# Patient Record
Sex: FEMALE | Race: WHITE | URBAN - METROPOLITAN AREA
[De-identification: names, ages, dates, MRNs, and addresses within clinical notes are randomized per-mention and may not be internally consistent; named-entity substitution may affect disease eponyms.]

---

## 2017-02-01 LAB
C TRACH DNA SPEC QL PROBE+SIG AMP: NORMAL
HBV SURFACE AG SERPL QL IA: NORMAL
N GONORRHOEA DNA SPEC QL PROBE+SIG AMP: NORMAL
RUBELLA ANTIBODY IGG QUANTITATIVE: NORMAL IU/ML
T PALLIDUM IGG SER QL: NORMAL

## 2017-08-25 LAB — GROUP B STREP PCR: NORMAL

## 2017-09-29 ENCOUNTER — ANESTHESIA (OUTPATIENT)
Dept: OBGYN | Facility: CLINIC | Age: 27
End: 2017-09-29
Payer: COMMERCIAL

## 2017-09-29 ENCOUNTER — ANESTHESIA EVENT (OUTPATIENT)
Dept: OBGYN | Facility: CLINIC | Age: 27
End: 2017-09-29
Payer: COMMERCIAL

## 2017-09-29 ENCOUNTER — HOSPITAL ENCOUNTER (INPATIENT)
Facility: CLINIC | Age: 27
LOS: 6 days | Discharge: HOME | End: 2017-10-05
Attending: OBSTETRICS & GYNECOLOGY | Admitting: OBSTETRICS & GYNECOLOGY
Payer: COMMERCIAL

## 2017-09-29 PROBLEM — Z3A.41 POST TERM PREGNANCY, 41 WEEKS: Status: ACTIVE | Noted: 2017-09-29

## 2017-09-29 PROBLEM — O48.0 POST TERM PREGNANCY, 41 WEEKS: Status: ACTIVE | Noted: 2017-09-29

## 2017-09-29 LAB
ABO + RH BLD: NORMAL
ALT SERPL W P-5'-P-CCNC: 11 U/L (ref 0–50)
ANION GAP SERPL CALCULATED.3IONS-SCNC: 10 MMOL/L (ref 3–14)
AST SERPL W P-5'-P-CCNC: 14 U/L (ref 0–45)
BASOPHILS # BLD AUTO: 0 10E9/L (ref 0–0.2)
BASOPHILS NFR BLD AUTO: 0.2 %
BLD GP AB SCN SERPL QL: NORMAL
BLOOD BANK CMNT PATIENT-IMP: NORMAL
BUN SERPL-MCNC: 10 MG/DL (ref 7–30)
CALCIUM SERPL-MCNC: 8.8 MG/DL (ref 8.5–10.1)
CHLORIDE SERPL-SCNC: 110 MMOL/L (ref 94–109)
CO2 SERPL-SCNC: 20 MMOL/L (ref 20–32)
CREAT SERPL-MCNC: 0.59 MG/DL (ref 0.52–1.04)
DIFFERENTIAL METHOD BLD: ABNORMAL
EOSINOPHIL # BLD AUTO: 0.1 10E9/L (ref 0–0.7)
EOSINOPHIL NFR BLD AUTO: 0.7 %
ERYTHROCYTE [DISTWIDTH] IN BLOOD BY AUTOMATED COUNT: 13.2 % (ref 10–15)
GFR SERPL CREATININE-BSD FRML MDRD: >90 ML/MIN/1.7M2
GLUCOSE SERPL-MCNC: 83 MG/DL (ref 70–99)
HCT VFR BLD AUTO: 38.2 % (ref 35–47)
HGB BLD-MCNC: 13.3 G/DL (ref 11.7–15.7)
IMM GRANULOCYTES # BLD: 0.1 10E9/L (ref 0–0.4)
IMM GRANULOCYTES NFR BLD: 0.4 %
LYMPHOCYTES # BLD AUTO: 2.1 10E9/L (ref 0.8–5.3)
LYMPHOCYTES NFR BLD AUTO: 16.9 %
MCH RBC QN AUTO: 29.8 PG (ref 26.5–33)
MCHC RBC AUTO-ENTMCNC: 34.8 G/DL (ref 31.5–36.5)
MCV RBC AUTO: 86 FL (ref 78–100)
MONOCYTES # BLD AUTO: 0.7 10E9/L (ref 0–1.3)
MONOCYTES NFR BLD AUTO: 5.7 %
NEUTROPHILS # BLD AUTO: 9.3 10E9/L (ref 1.6–8.3)
NEUTROPHILS NFR BLD AUTO: 76.1 %
PLATELET # BLD AUTO: 296 10E9/L (ref 150–450)
POTASSIUM SERPL-SCNC: 3.7 MMOL/L (ref 3.4–5.3)
RBC # BLD AUTO: 4.47 10E12/L (ref 3.8–5.2)
SODIUM SERPL-SCNC: 140 MMOL/L (ref 133–144)
SPECIMEN EXP DATE BLD: NORMAL
SPECIMEN EXP DATE BLD: NORMAL
WBC # BLD AUTO: 12.2 10E9/L (ref 4–11)

## 2017-09-29 PROCEDURE — 86901 BLOOD TYPING SEROLOGIC RH(D): CPT | Performed by: OBSTETRICS & GYNECOLOGY

## 2017-09-29 PROCEDURE — 25000132 ZZH RX MED GY IP 250 OP 250 PS 637: Performed by: OBSTETRICS & GYNECOLOGY

## 2017-09-29 PROCEDURE — 10907ZC DRAINAGE OF AMNIOTIC FLUID, THERAPEUTIC FROM PRODUCTS OF CONCEPTION, VIA NATURAL OR ARTIFICIAL OPENING: ICD-10-PCS | Performed by: OBSTETRICS & GYNECOLOGY

## 2017-09-29 PROCEDURE — 36000056 ZZH SURGERY LEVEL 3 1ST 30 MIN: Performed by: OBSTETRICS & GYNECOLOGY

## 2017-09-29 PROCEDURE — 25000128 H RX IP 250 OP 636: Performed by: NURSE ANESTHETIST, CERTIFIED REGISTERED

## 2017-09-29 PROCEDURE — 3E033VJ INTRODUCTION OF OTHER HORMONE INTO PERIPHERAL VEIN, PERCUTANEOUS APPROACH: ICD-10-PCS | Performed by: OBSTETRICS & GYNECOLOGY

## 2017-09-29 PROCEDURE — 71000015 ZZH RECOVERY PHASE 1 LEVEL 2 EA ADDTL HR: Performed by: OBSTETRICS & GYNECOLOGY

## 2017-09-29 PROCEDURE — 84450 TRANSFERASE (AST) (SGOT): CPT | Performed by: OBSTETRICS & GYNECOLOGY

## 2017-09-29 PROCEDURE — 3E0R3BZ INTRODUCTION OF ANESTHETIC AGENT INTO SPINAL CANAL, PERCUTANEOUS APPROACH: ICD-10-PCS | Performed by: ANESTHESIOLOGY

## 2017-09-29 PROCEDURE — 71000014 ZZH RECOVERY PHASE 1 LEVEL 2 FIRST HR: Performed by: OBSTETRICS & GYNECOLOGY

## 2017-09-29 PROCEDURE — 86900 BLOOD TYPING SEROLOGIC ABO: CPT | Performed by: OBSTETRICS & GYNECOLOGY

## 2017-09-29 PROCEDURE — 86850 RBC ANTIBODY SCREEN: CPT | Performed by: OBSTETRICS & GYNECOLOGY

## 2017-09-29 PROCEDURE — 84460 ALANINE AMINO (ALT) (SGPT): CPT | Performed by: OBSTETRICS & GYNECOLOGY

## 2017-09-29 PROCEDURE — 25000125 ZZHC RX 250: Performed by: NURSE ANESTHETIST, CERTIFIED REGISTERED

## 2017-09-29 PROCEDURE — 80048 BASIC METABOLIC PNL TOTAL CA: CPT | Performed by: OBSTETRICS & GYNECOLOGY

## 2017-09-29 PROCEDURE — 25000125 ZZHC RX 250: Performed by: ANESTHESIOLOGY

## 2017-09-29 PROCEDURE — 86780 TREPONEMA PALLIDUM: CPT | Performed by: OBSTETRICS & GYNECOLOGY

## 2017-09-29 PROCEDURE — 37000009 ZZH ANESTHESIA TECHNICAL FEE, EACH ADDTL 15 MIN: Performed by: OBSTETRICS & GYNECOLOGY

## 2017-09-29 PROCEDURE — 37000011 ZZH ANESTHESIA WARD SERVICE

## 2017-09-29 PROCEDURE — 36415 COLL VENOUS BLD VENIPUNCTURE: CPT | Performed by: OBSTETRICS & GYNECOLOGY

## 2017-09-29 PROCEDURE — 25000128 H RX IP 250 OP 636: Performed by: ANESTHESIOLOGY

## 2017-09-29 PROCEDURE — 82565 ASSAY OF CREATININE: CPT | Performed by: OBSTETRICS & GYNECOLOGY

## 2017-09-29 PROCEDURE — 36000058 ZZH SURGERY LEVEL 3 EA 15 ADDTL MIN: Performed by: OBSTETRICS & GYNECOLOGY

## 2017-09-29 PROCEDURE — 27210794 ZZH OR GENERAL SUPPLY STERILE: Performed by: OBSTETRICS & GYNECOLOGY

## 2017-09-29 PROCEDURE — 00HU33Z INSERTION OF INFUSION DEVICE INTO SPINAL CANAL, PERCUTANEOUS APPROACH: ICD-10-PCS | Performed by: ANESTHESIOLOGY

## 2017-09-29 PROCEDURE — 85025 COMPLETE CBC W/AUTO DIFF WBC: CPT | Performed by: OBSTETRICS & GYNECOLOGY

## 2017-09-29 PROCEDURE — 37000008 ZZH ANESTHESIA TECHNICAL FEE, 1ST 30 MIN: Performed by: OBSTETRICS & GYNECOLOGY

## 2017-09-29 PROCEDURE — 12000029 ZZH R&B OB INTERMEDIATE

## 2017-09-29 PROCEDURE — 25000125 ZZHC RX 250: Performed by: OBSTETRICS & GYNECOLOGY

## 2017-09-29 PROCEDURE — 25000128 H RX IP 250 OP 636: Performed by: OBSTETRICS & GYNECOLOGY

## 2017-09-29 RX ORDER — CEFAZOLIN SODIUM 2 G/100ML
2 INJECTION, SOLUTION INTRAVENOUS
Status: DISCONTINUED | OUTPATIENT
Start: 2017-09-29 | End: 2017-09-30 | Stop reason: HOSPADM

## 2017-09-29 RX ORDER — NALOXONE HYDROCHLORIDE 0.4 MG/ML
.1-.4 INJECTION, SOLUTION INTRAMUSCULAR; INTRAVENOUS; SUBCUTANEOUS
Status: DISCONTINUED | OUTPATIENT
Start: 2017-09-29 | End: 2017-09-30

## 2017-09-29 RX ORDER — ACETAMINOPHEN 325 MG/1
650 TABLET ORAL EVERY 4 HOURS PRN
Status: DISCONTINUED | OUTPATIENT
Start: 2017-09-29 | End: 2017-09-30

## 2017-09-29 RX ORDER — SODIUM CHLORIDE, SODIUM LACTATE, POTASSIUM CHLORIDE, CALCIUM CHLORIDE 600; 310; 30; 20 MG/100ML; MG/100ML; MG/100ML; MG/100ML
INJECTION, SOLUTION INTRAVENOUS CONTINUOUS
Status: DISCONTINUED | OUTPATIENT
Start: 2017-09-29 | End: 2017-09-30

## 2017-09-29 RX ORDER — CEFAZOLIN SODIUM 1 G/3ML
1 INJECTION, POWDER, FOR SOLUTION INTRAMUSCULAR; INTRAVENOUS SEE ADMIN INSTRUCTIONS
Status: DISCONTINUED | OUTPATIENT
Start: 2017-09-29 | End: 2017-09-30 | Stop reason: HOSPADM

## 2017-09-29 RX ORDER — PRENATAL VIT/IRON FUM/FOLIC AC 27MG-0.8MG
1 TABLET ORAL DAILY
COMMUNITY

## 2017-09-29 RX ORDER — ROPIVACAINE HYDROCHLORIDE 2 MG/ML
10 INJECTION, SOLUTION EPIDURAL; INFILTRATION; PERINEURAL ONCE
Status: COMPLETED | OUTPATIENT
Start: 2017-09-29 | End: 2017-09-29

## 2017-09-29 RX ORDER — LIDOCAINE HYDROCHLORIDE 20 MG/ML
INJECTION, SOLUTION EPIDURAL; INFILTRATION; INTRACAUDAL; PERINEURAL
Status: DISCONTINUED
Start: 2017-09-29 | End: 2017-09-29 | Stop reason: HOSPADM

## 2017-09-29 RX ORDER — ONDANSETRON 2 MG/ML
4 INJECTION INTRAMUSCULAR; INTRAVENOUS EVERY 6 HOURS PRN
Status: DISCONTINUED | OUTPATIENT
Start: 2017-09-29 | End: 2017-09-30

## 2017-09-29 RX ORDER — LIDOCAINE HYDROCHLORIDE 20 MG/ML
INJECTION, SOLUTION EPIDURAL; INFILTRATION; INTRACAUDAL; PERINEURAL PRN
Status: DISCONTINUED | OUTPATIENT
Start: 2017-09-29 | End: 2017-09-30

## 2017-09-29 RX ORDER — CETIRIZINE HYDROCHLORIDE 10 MG/1
10 TABLET ORAL DAILY
COMMUNITY

## 2017-09-29 RX ORDER — CEFAZOLIN SODIUM 1 G/3ML
INJECTION, POWDER, FOR SOLUTION INTRAMUSCULAR; INTRAVENOUS PRN
Status: DISCONTINUED | OUTPATIENT
Start: 2017-09-29 | End: 2017-09-30

## 2017-09-29 RX ORDER — LIDOCAINE HYDROCHLORIDE AND EPINEPHRINE 15; 5 MG/ML; UG/ML
INJECTION, SOLUTION EPIDURAL PRN
Status: DISCONTINUED | OUTPATIENT
Start: 2017-09-29 | End: 2017-09-30 | Stop reason: HOSPADM

## 2017-09-29 RX ORDER — OXYTOCIN/0.9 % SODIUM CHLORIDE 30/500 ML
PLASTIC BAG, INJECTION (ML) INTRAVENOUS CONTINUOUS PRN
Status: DISCONTINUED | OUTPATIENT
Start: 2017-09-29 | End: 2017-09-30

## 2017-09-29 RX ORDER — PENICILLIN G POTASSIUM 5000000 [IU]/1
5 INJECTION, POWDER, FOR SOLUTION INTRAMUSCULAR; INTRAVENOUS ONCE
Status: COMPLETED | OUTPATIENT
Start: 2017-09-29 | End: 2017-09-29

## 2017-09-29 RX ORDER — METHYLERGONOVINE MALEATE 0.2 MG/ML
200 INJECTION INTRAVENOUS
Status: DISCONTINUED | OUTPATIENT
Start: 2017-09-29 | End: 2017-09-30

## 2017-09-29 RX ORDER — MORPHINE SULFATE 1 MG/ML
INJECTION, SOLUTION EPIDURAL; INTRATHECAL; INTRAVENOUS PRN
Status: DISCONTINUED | OUTPATIENT
Start: 2017-09-29 | End: 2017-09-30

## 2017-09-29 RX ORDER — SODIUM CHLORIDE, SODIUM LACTATE, POTASSIUM CHLORIDE, CALCIUM CHLORIDE 600; 310; 30; 20 MG/100ML; MG/100ML; MG/100ML; MG/100ML
INJECTION, SOLUTION INTRAVENOUS CONTINUOUS
Status: DISCONTINUED | OUTPATIENT
Start: 2017-09-29 | End: 2017-09-30 | Stop reason: HOSPADM

## 2017-09-29 RX ORDER — NALBUPHINE HYDROCHLORIDE 10 MG/ML
2.5-5 INJECTION, SOLUTION INTRAMUSCULAR; INTRAVENOUS; SUBCUTANEOUS EVERY 6 HOURS PRN
Status: DISCONTINUED | OUTPATIENT
Start: 2017-09-29 | End: 2017-09-30

## 2017-09-29 RX ORDER — OXYTOCIN/0.9 % SODIUM CHLORIDE 30/500 ML
100-340 PLASTIC BAG, INJECTION (ML) INTRAVENOUS CONTINUOUS PRN
Status: DISCONTINUED | OUTPATIENT
Start: 2017-09-29 | End: 2017-09-30

## 2017-09-29 RX ORDER — OXYTOCIN/0.9 % SODIUM CHLORIDE 30/500 ML
PLASTIC BAG, INJECTION (ML) INTRAVENOUS
Status: DISCONTINUED
Start: 2017-09-29 | End: 2017-09-29 | Stop reason: HOSPADM

## 2017-09-29 RX ORDER — ONDANSETRON 2 MG/ML
INJECTION INTRAMUSCULAR; INTRAVENOUS
Status: DISCONTINUED
Start: 2017-09-29 | End: 2017-09-29 | Stop reason: HOSPADM

## 2017-09-29 RX ORDER — EPHEDRINE SULFATE 50 MG/ML
INJECTION, SOLUTION INTRAMUSCULAR; INTRAVENOUS; SUBCUTANEOUS PRN
Status: DISCONTINUED | OUTPATIENT
Start: 2017-09-29 | End: 2017-09-30

## 2017-09-29 RX ORDER — LIDOCAINE HYDROCHLORIDE 20 MG/ML
INJECTION, SOLUTION EPIDURAL; INFILTRATION; INTRACAUDAL; PERINEURAL
Status: COMPLETED
Start: 2017-09-29 | End: 2017-09-29

## 2017-09-29 RX ORDER — MORPHINE SULFATE 1 MG/ML
INJECTION, SOLUTION EPIDURAL; INTRATHECAL; INTRAVENOUS
Status: DISCONTINUED
Start: 2017-09-29 | End: 2017-09-29 | Stop reason: HOSPADM

## 2017-09-29 RX ORDER — LIDOCAINE HCL/EPINEPHRINE/PF 2%-1:200K
VIAL (ML) INJECTION PRN
Status: DISCONTINUED | OUTPATIENT
Start: 2017-09-29 | End: 2017-09-30

## 2017-09-29 RX ORDER — PROPOFOL 10 MG/ML
INJECTION, EMULSION INTRAVENOUS
Status: DISCONTINUED
Start: 2017-09-29 | End: 2017-09-30 | Stop reason: HOSPADM

## 2017-09-29 RX ORDER — PROPOFOL 10 MG/ML
INJECTION, EMULSION INTRAVENOUS PRN
Status: DISCONTINUED | OUTPATIENT
Start: 2017-09-29 | End: 2017-09-30

## 2017-09-29 RX ORDER — CEFAZOLIN SODIUM 2 G/100ML
INJECTION, SOLUTION INTRAVENOUS
Status: DISCONTINUED
Start: 2017-09-29 | End: 2017-09-29 | Stop reason: HOSPADM

## 2017-09-29 RX ORDER — CITRIC ACID/SODIUM CITRATE 334-500MG
30 SOLUTION, ORAL ORAL
Status: COMPLETED | OUTPATIENT
Start: 2017-09-29 | End: 2017-09-29

## 2017-09-29 RX ORDER — OXYCODONE AND ACETAMINOPHEN 5; 325 MG/1; MG/1
1 TABLET ORAL
Status: DISCONTINUED | OUTPATIENT
Start: 2017-09-29 | End: 2017-09-30

## 2017-09-29 RX ORDER — SODIUM CHLORIDE, SODIUM LACTATE, POTASSIUM CHLORIDE, CALCIUM CHLORIDE 600; 310; 30; 20 MG/100ML; MG/100ML; MG/100ML; MG/100ML
INJECTION, SOLUTION INTRAVENOUS CONTINUOUS PRN
Status: DISCONTINUED | OUTPATIENT
Start: 2017-09-29 | End: 2017-09-30

## 2017-09-29 RX ORDER — EPHEDRINE SULFATE 50 MG/ML
5 INJECTION, SOLUTION INTRAMUSCULAR; INTRAVENOUS; SUBCUTANEOUS
Status: DISCONTINUED | OUTPATIENT
Start: 2017-09-29 | End: 2017-09-30

## 2017-09-29 RX ORDER — LIDOCAINE HCL/EPINEPHRINE/PF 2%-1:200K
VIAL (ML) INJECTION
Status: COMPLETED
Start: 2017-09-29 | End: 2017-09-29

## 2017-09-29 RX ORDER — CARBOPROST TROMETHAMINE 250 UG/ML
250 INJECTION, SOLUTION INTRAMUSCULAR
Status: DISCONTINUED | OUTPATIENT
Start: 2017-09-29 | End: 2017-09-30

## 2017-09-29 RX ORDER — ONDANSETRON 2 MG/ML
INJECTION INTRAMUSCULAR; INTRAVENOUS PRN
Status: DISCONTINUED | OUTPATIENT
Start: 2017-09-29 | End: 2017-09-30

## 2017-09-29 RX ORDER — LIDOCAINE 40 MG/G
CREAM TOPICAL
Status: DISCONTINUED | OUTPATIENT
Start: 2017-09-29 | End: 2017-09-30

## 2017-09-29 RX ORDER — OXYTOCIN 10 [USP'U]/ML
10 INJECTION, SOLUTION INTRAMUSCULAR; INTRAVENOUS
Status: DISCONTINUED | OUTPATIENT
Start: 2017-09-29 | End: 2017-09-30

## 2017-09-29 RX ORDER — IBUPROFEN 400 MG/1
800 TABLET, FILM COATED ORAL
Status: DISCONTINUED | OUTPATIENT
Start: 2017-09-29 | End: 2017-09-30

## 2017-09-29 RX ORDER — OXYTOCIN/0.9 % SODIUM CHLORIDE 30/500 ML
1-24 PLASTIC BAG, INJECTION (ML) INTRAVENOUS CONTINUOUS
Status: DISCONTINUED | OUTPATIENT
Start: 2017-09-29 | End: 2017-09-30

## 2017-09-29 RX ADMIN — SODIUM CHLORIDE, POTASSIUM CHLORIDE, SODIUM LACTATE AND CALCIUM CHLORIDE: 600; 310; 30; 20 INJECTION, SOLUTION INTRAVENOUS at 10:07

## 2017-09-29 RX ADMIN — ROPIVACAINE HYDROCHLORIDE 10 ML: 2 INJECTION, SOLUTION EPIDURAL; INFILTRATION at 14:23

## 2017-09-29 RX ADMIN — SODIUM CHLORIDE, POTASSIUM CHLORIDE, SODIUM LACTATE AND CALCIUM CHLORIDE: 600; 310; 30; 20 INJECTION, SOLUTION INTRAVENOUS at 23:13

## 2017-09-29 RX ADMIN — LIDOCAINE HYDROCHLORIDE 5 ML: 20 INJECTION, SOLUTION EPIDURAL; INFILTRATION; INTRACAUDAL; PERINEURAL at 23:21

## 2017-09-29 RX ADMIN — SODIUM CHLORIDE, POTASSIUM CHLORIDE, SODIUM LACTATE AND CALCIUM CHLORIDE: 600; 310; 30; 20 INJECTION, SOLUTION INTRAVENOUS at 15:25

## 2017-09-29 RX ADMIN — Medication 340 ML/HR: at 23:45

## 2017-09-29 RX ADMIN — SODIUM CHLORIDE 2.5 MILLION UNITS: 9 INJECTION, SOLUTION INTRAVENOUS at 14:36

## 2017-09-29 RX ADMIN — Medication 10 MG: at 23:54

## 2017-09-29 RX ADMIN — PROPOFOL 10 MG: 10 INJECTION, EMULSION INTRAVENOUS at 23:59

## 2017-09-29 RX ADMIN — LIDOCAINE HYDROCHLORIDE 5 ML: 20 INJECTION, SOLUTION EPIDURAL; INFILTRATION; INTRACAUDAL; PERINEURAL at 23:17

## 2017-09-29 RX ADMIN — LIDOCAINE HYDROCHLORIDE,EPINEPHRINE BITARTRATE 10 ML: 20; .005 INJECTION, SOLUTION EPIDURAL; INFILTRATION; INTRACAUDAL; PERINEURAL at 23:16

## 2017-09-29 RX ADMIN — PENICILLIN G POTASSIUM 5 MILLION UNITS: 5000000 POWDER, FOR SOLUTION INTRAMUSCULAR; INTRAPLEURAL; INTRATHECAL; INTRAVENOUS at 10:15

## 2017-09-29 RX ADMIN — MORPHINE SULFATE 3 MG: 1 INJECTION EPIDURAL; INTRATHECAL; INTRAVENOUS at 23:46

## 2017-09-29 RX ADMIN — LIDOCAINE HYDROCHLORIDE,EPINEPHRINE BITARTRATE 10 ML: 20; .005 INJECTION, SOLUTION EPIDURAL; INFILTRATION; INTRACAUDAL; PERINEURAL at 23:14

## 2017-09-29 RX ADMIN — SODIUM CHLORIDE, POTASSIUM CHLORIDE, SODIUM LACTATE AND CALCIUM CHLORIDE: 600; 310; 30; 20 INJECTION, SOLUTION INTRAVENOUS at 23:54

## 2017-09-29 RX ADMIN — CEFAZOLIN 2 G: 1 INJECTION, POWDER, FOR SOLUTION INTRAMUSCULAR; INTRAVENOUS at 23:16

## 2017-09-29 RX ADMIN — SODIUM CHLORIDE, POTASSIUM CHLORIDE, SODIUM LACTATE AND CALCIUM CHLORIDE 1000 ML: 600; 310; 30; 20 INJECTION, SOLUTION INTRAVENOUS at 14:03

## 2017-09-29 RX ADMIN — SODIUM CITRATE AND CITRIC ACID MONOHYDRATE 30 ML: 500; 334 SOLUTION ORAL at 23:01

## 2017-09-29 RX ADMIN — LIDOCAINE HYDROCHLORIDE AND EPINEPHRINE 3 ML: 15; 5 INJECTION, SOLUTION EPIDURAL at 14:17

## 2017-09-29 RX ADMIN — ONDANSETRON 4 MG: 2 INJECTION INTRAMUSCULAR; INTRAVENOUS at 23:22

## 2017-09-29 RX ADMIN — OXYTOCIN-SODIUM CHLORIDE 0.9% IV SOLN 30 UNIT/500ML 2 MILLI-UNITS/MIN: 30-0.9/5 SOLUTION at 10:21

## 2017-09-29 RX ADMIN — ACETAMINOPHEN 650 MG: 325 TABLET, FILM COATED ORAL at 21:29

## 2017-09-29 RX ADMIN — SODIUM CHLORIDE 2.5 MILLION UNITS: 9 INJECTION, SOLUTION INTRAVENOUS at 19:01

## 2017-09-29 RX ADMIN — Medication 12 ML/HR: at 14:35

## 2017-09-29 NOTE — PLAN OF CARE
0920 Pt  41+1 presents to L&D for induction for postdates.  Pt oriented to rm & call light.  POC discussed with pt. And her .  They agree with plan.  EUM/US applied.  HX rev'd with pt. Pt GBS+.    0955 Dr. Boswell updated by phone pt here.  Will plan to start pitocin and pcn.  Dr. Boswell will be over early afternoon to AROM.    1020 SVE by RN. Pitocin started.    1045 Dr. Boswell updated by text that PCN & pitocin were started at 1020.  Also updated on elevated  BP's and ? To have pre-eclampsia labs drawn.    1140 PC from Dr. Boswell, updated MD on bp's since text update, uc's q2-6 min, pit at 6mU.  Order rec'd for pre-eclampsia labs.    1335 Dr. Boswell at bedside.  SVE and AROM by MD.  Cl fluid.  Pt requesting an epidural.  IV fluid bolusing.    1415 Dr. Ortega at bedside for epidural.    1427 Epidural complete.    1500 ross placed.  SVE by RN.  /-2    1520 FHR variable decels down to 70 x 60-90 and one prolonged decel to 70 x 2 1/2 min.  Pt repositioned to LL, LR bolus, O2 given at 15L/min.  FHr RTB    1530 Dr. Boswell updated by phone on epidural, SVE, decels, interventions,  uc's, bp's.  No new orders rec'd.  Will continue to monitor.    1635 Pt repositioned to RL.  FHR decel down to 70's x 3 min.  Pt repositioned to LL. Pitocin stopped.  FHR .      1710 Dr. Boswell updated by phone on FHR, decel, uc's, pitocin stopped, pt feeling some pressure with uc's.  No new orders rec'd.    1805 Dr. Boswell at bedside.  SVE by MD.  Pt complete.  Will plan to labor down.    184 Attempt to reposition pt to RL.  FHR decel down to 90's.  Pt repositioned to LL.  FHR RTB of 125.  Continue with O2 and continue to monitor.     Report to MARTÍN Newell RN to assume care.

## 2017-09-29 NOTE — H&P
.  [facility]    History and Physical  Obstetrics and Gynecology     Date of Admission:  2017  Date of Service (when I saw the patient): 17    Assessment & Plan   Manpreet Zabala is a 26 year old female who presents for PD IOL @ 41 1/7 weeks.    ASSESSMENT:   IUP @ 41w1d for induction of labor.  Indication Postdates.  NST reactive.  Category  I  Prenatal course was complicated by polyhdramnios noted third trimester that did resolve.    PLAN:   Admit - see IP orders  Prophylactic antibiotic for + GBS status  Anticipate   MD consultant on call Elbert/ available prn  Labor induction with Pitocin and AROM    Josselin Boswell MD, MD  [facility]      History of Present Illness   Manpreet Zabala is a 26 year old female, , at 41w1d.  Her EDC is 2017, by [facility].   No LMP recorded. Patient is pregnant.  [facility] of [facility].  Indication postdates.    Prenatal Course  Prenatal course was essentially uncomplicated      No lab results found.  Rhogam not indicated   No lab results found.    Obstetric History       T0      L0     SAB0   TAB0   Ectopic0   Multiple0   Live Births0        Past Medical History    I have reviewed this patient's medical history and updated it with pertinent information if needed.   No past medical history on file.    Past Surgical History   I have reviewed this patient's surgical history and updated it with pertinent information if needed.  No past surgical history on file.    Prior to Admission Medications   None     Allergies   No Known Allergies    Social History   I have reviewed this patient's social history and updated it with pertinent information if needed. Manpreet Zabala      Family History   I have reviewed this patient's family history and updated it with pertinent information if needed.   No family history on file.    Immunization History   Immunizations are up to date    Physical Exam      No data found.      Abdomen: gravid, single vertex fetus, non-tender, EFW 9 lbs   Cervical Exam: 4/ 70/ Mid/ soft/ -3     Fetal Heart Tones: 140 baseline, moderate variablility, + accels, no decels and Category I  TOCO:   external monitor and none per office NST 9/25.    Constitutional: healthy, alert, active and cooperative   Respiratory: No increased work of breathing, good air exchange, clear to auscultation bilaterally, no crackles or wheezing  Cardiovascular: regular rate and rhythm  Skin/Extremites: no bruising or bleeding  Neurologic: Awake, alert, oriented to name, place and time.  Cranial nerves II-XII are grossly intact.  Motor is 5 out of 5 bilaterally.  Cerebellar finger to nose, heel to shin intact.  Sensory is intact.  Babinski down going, Romberg negative, and gait is normal.  Neuropsychiatric: General: normal, calm and normal eye contact

## 2017-09-29 NOTE — IP AVS SNAPSHOT
[address]    [place] [address]    Phone:  [phone]                                       After Visit Summary   9/29/2017    Manpreet Zabala    MRN: [id number]           After Visit Summary Signature Page     I have received my discharge instructions, and my questions have been answered. I have discussed any challenges I see with this plan with the nurse or doctor.    ..........................................................................................................................................  Patient/Patient Representative Signature      ..........................................................................................................................................  Patient Representative Print Name and Relationship to Patient    ..................................................               ................................................  Date                                            Time    ..........................................................................................................................................  Reviewed by Signature/Title    ...................................................              ..............................................  Date                                                            Time

## 2017-09-29 NOTE — ANESTHESIA PREPROCEDURE EVALUATION
Anesthesia Evaluation     .             ROS/MED HX    ENT/Pulmonary:       Neurologic:       Cardiovascular:     (+) hypertension----. : . . . :. .       METS/Exercise Tolerance:     Hematologic:         Musculoskeletal:         GI/Hepatic:         Renal/Genitourinary:         Endo:         Psychiatric:         Infectious Disease:         Malignancy:         Other:                                    Anesthesia Plan      History & Physical Review      ASA Status:  2 .        Plan for Epidural          Postoperative Care      Consents                          .

## 2017-09-29 NOTE — IP AVS SNAPSHOT
MRN:[id number]                      After Visit Summary   9/29/2017    Manpreet Zabala    MRN: [id number]           Thank you!     Thank you for choosing [facility] for your care. Our goal is always to provide you with excellent care. Hearing back from our patients is one way we can continue to improve our services. Please take a few minutes to complete the written survey that you may receive in the mail after you visit with us. Thank you!        Patient Information     Date Of Birth          1990        About your hospital stay     You were admitted on:  September 29, 2017 You last received care in the:  [address]    You were discharged on:  October 5, 2017        Reason for your hospital stay       Maternity care                  Who to Call     For medical emergencies, please call 911.  For non-urgent questions about your medical care, please call your primary care provider or clinic, None  For questions related to your surgery, please call your surgery clinic        Attending Provider     Provider Specialty    Josselin Boswell MD OB/Gyn       Primary Care Provider Fax #    Physician No Ref-Primary [phone]      After Care Instructions     Activity       Review discharge instructions            Diet       Resume previous diet            Discharge Instructions - Gestational diabetic patients       Gestational diabetic patients to follow up for fasting blood sugar and 2 hour 75gm glucose load at 6 weeks postpartum.            Discharge Instructions - Hypertensive disorders patients       Do not take Labetalol if systolic BP< 100, diastolic BP< 60 or heart rate <60. Take Labetalol and call [facility] if sBP>160 or dBP>110 2 consecutive times  by 10 minutes.            Discharge Instructions - Postpartum visit       Schedule postpartum visit with your provider and return to clinic in 6 weeks.                  Follow-up Appointments     Follow Up and  recommended labs and tests       [facility]. Monday 10/9 at 9AM                  Further instructions from your care team       Postop  Birth Instructions    Activity       Do not lift more than 10 pounds for 6 weeks after surgery.  Ask family and friends for help when you need it.    No driving until you have stopped taking your pain medications (usually two weeks after surgery).    No heavy exercise or activity for 6 weeks.  Don't do anything that will put a strain on your surgery site.    Don't strain when using the toilet.  Your care team may prescribe a stool softener if you have problems with your bowel movements.     To care for your incision:       Keep the incision clean and dry.    Do not soak your incision in water. No swimming or hot tubs until it has fully healed. You may soak in the bathtub if the water level is below your incision.    Do not use peroxide, gel, cream, lotion, or ointment on your incision.    Adjust your clothes to avoid pressure on your surgery site (check the elastic in your underwear for example).     You may see a small amount of clear or pink drainage and this is normal.  Check with your health care provider:       If the drainage increases or has an odor.    If the incision reddens, you have swelling, or develop a rash.    If you have increased pain and the medicine we prescribed doesn't help.    If you have a fever above 100.4 F (38 C) with or without chills when placing thermometer under your tongue.   The area around your incision (surgery wound), will feel numb.  This is normal. The numbness should go away in less than a year.     Keep your hands clean:  Always wash your hands before touching your incision (surgery wound). This helps reduce your risk of infection. If your hands aren't dirty, you may use an alcohol hand-rub to clean your hands. Keep your nails clean and short.    Call your healthcare provider if you have any of these symptoms:       You soak  "a sanitary pad with blood within 1 hour, or you see blood clots larger than a golf ball.    Bleeding that lasts more than 6 weeks.    Vaginal discharge that smells bad.    Severe pain, cramping or tenderness in your lower belly area.    A need to urinate more frequently (use the toilet more often), more urgently (use the toilet very quickly), or it burns when you urinate.    Nausea and vomiting.    Redness, swelling or pain around a vein in your leg.    Problems breastfeeding or a red or painful area on your breast.    Chest pain and cough or are gasping for air.    Problems with coping with sadness, anxiety or depression. If you have concerns about hurting yourself or the baby, call your provider immediately.      You have questions or concerns after you return home.                  Pending Results     No orders found from 9/27/2017 to 9/30/2017.            Statement of Approval     Ordered          10/05/17 0905  I have reviewed and agree with all the recommendations and orders detailed in this document.  EFFECTIVE NOW     Approved and electronically signed by:  Schwab, Jennifer Lani, MD             Admission Information     Date & Time Provider Department Dept. Phone    9/29/2017 Josselin Boswell MD [address] [phone]      Your Vitals Were     Blood Pressure Pulse Temperature Respirations Height Weight    133/91 70 98.7  F (37.1  C) (Oral) 16 1.619 m (5' 3.75\") 94.4 kg (208 lb 1.8 oz)    Pulse Oximetry BMI (Body Mass Index)                100% 36 kg/m2          [employer] Information     [employer] lets you send messages to your doctor, view your test results, renew your prescriptions, schedule appointments and more. To sign up, go to [email or web address] . Click on \"Log in\" on the left side of the screen, which will take you to the Welcome page. Then click on \"Sign up Now\" on the right side of the page.     You will be asked to enter the access code listed below, as well as some " personal information. Please follow the directions to create your username and password.     Your access code is: [email or web address]  Expires: 1/3/2018 10:49 AM     Your access code will  in 90 days. If you need help or a new code, please call your [facility] clinic or [phone].        Care EveryWhere ID     This is your Care EveryWhere ID. This could be used by other organizations to access your [facility] medical records  [id number]        Equal Access to Services     [facility] : Hadyair melendez hadasho Soomaali, waaxda luqadaha, qaybta kaalmada adeegyada, serena curran . So [facility] [phone].    ATENCIÓN: Si habla español, tiene a fraser disposición servicios gratuitos de asistencia lingüística. Llame al [phone].    We comply with applicable federal civil rights laws and [place] laws. We do not discriminate on the basis of race, color, national origin, age, disability, sex, sexual orientation, or gender identity.               Review of your medicines      START taking        Dose / Directions    acetaminophen 325 MG tablet   Commonly known as:  TYLENOL        Dose:  650 mg   Take 2 tablets (650 mg) by mouth every 4 hours as needed for other (surgical pain)   Quantity:  100 tablet   Refills:  0       labetalol 300 MG tablet   Commonly known as:  NORMODYNE        Dose:  300 mg   Take 1 tablet (300 mg) by mouth every 8 hours   Quantity:  60 tablet   Refills:  0       oxyCODONE 5 MG IR tablet   Commonly known as:  ROXICODONE        Dose:  5 mg   Take 1 tablet (5 mg) by mouth every 4 hours as needed for moderate to severe pain   Quantity:  20 tablet   Refills:  0         CONTINUE these medicines which have NOT CHANGED        Dose / Directions    cetirizine 10 MG tablet   Commonly known as:  zyrTEC        Dose:  10 mg   Take 10 mg by mouth daily   Refills:  0       prenatal multivitamin plus iron 27-0.8 MG Tabs per tablet        Dose:  1 tablet   Take 1 tablet by mouth daily   Refills:   0       VITAMIN D (CHOLECALCIFEROL) PO        Dose:  2000 Units   Take 2,000 Units by mouth daily   Refills:  0            Where to get your medicines      Some of these will need a paper prescription and others can be bought over the counter. Ask your nurse if you have questions.     Bring a paper prescription for each of these medications     labetalol 300 MG tablet    oxyCODONE 5 MG IR tablet       You don't need a prescription for these medications     acetaminophen 325 MG tablet                Protect others around you: Learn how to safely use, store and throw away your medicines at www.disposemymeds.org.             Medication List: This is a list of all your medications and when to take them. Check marks below indicate your daily home schedule. Keep this list as a reference.      Medications           Morning Afternoon Evening Bedtime As Needed    acetaminophen 325 MG tablet   Commonly known as:  TYLENOL   Take 2 tablets (650 mg) by mouth every 4 hours as needed for other (surgical pain)   Last time this was given:  650 mg on 10/5/2017  6:58 PM                                cetirizine 10 MG tablet   Commonly known as:  zyrTEC   Take 10 mg by mouth daily   Last time this was given:  10 mg on 10/5/2017  8:38 AM                                labetalol 300 MG tablet   Commonly known as:  NORMODYNE   Take 1 tablet (300 mg) by mouth every 8 hours   Last time this was given:  300 mg on 10/5/2017  1:51 PM                                oxyCODONE 5 MG IR tablet   Commonly known as:  ROXICODONE   Take 1 tablet (5 mg) by mouth every 4 hours as needed for moderate to severe pain   Last time this was given:  5 mg on 10/5/2017  6:58 PM                                prenatal multivitamin plus iron 27-0.8 MG Tabs per tablet   Take 1 tablet by mouth daily   Last time this was given:  1 tablet on 10/5/2017  8:38 AM                                VITAMIN D (CHOLECALCIFEROL) PO   Take 2,000 Units by mouth daily   Last time  this was given:  2,000 Units on 10/5/2017  8:38 AM

## 2017-09-29 NOTE — PROGRESS NOTES
"OB  Patient is 25yo  @ 41 1/7 weeks admitted earlier this morning for PD IOL. Prophylactic abx started for (+) GBS, pitocin underway. Patient becoming more uncomfortable-stating that \"it is all in my back\".  /88  Temp 97.7  F (36.5  C) (Axillary)  Ht 1.619 m (5' 3.75\")  Wt 93.4 kg (206 lb)  BMI 35.64 kg/m2     cx 5/80/-2  AROM performed, clear fluid  FHT baseline 130 with accels, category 1  Ctxns q2-4\", pitocin @ 8 mu    PIH labs drawn earlier secondary to elevated BP. All values normal    A/P: 25 yo  @ 41 1/7 weeks PD IOL, GBS(+)  AROM now performed, clear fluid.  Continue pitocin.  Patient may have epidural for pain relief. OK to place ross catheter for bladder care.    "

## 2017-09-29 NOTE — ANESTHESIA PROCEDURE NOTES
Peripheral nerve/Neuraxial procedure note : epidural catheter  Pre-Procedure  Performed by LEVAR ARENAS  Location: OB      Pre-Anesthestic Checklist: patient identified, IV checked, risks and benefits discussed, informed consent and pre-op evaluation    Timeout  Correct Patient: Yes   Correct Procedure: Yes   Correct Site: Yes   Correct Laterality: N/A   Correct Position: Yes   Site Marked: N/A   .   Procedure Documentation    .    Procedure:    Epidural catheter.  Insertion Site:L3-4  (midline approach) Injection technique: LORT saline   Local skin infiltrated with mL of 1% lidocaine.  JP at 5 cm     Patient Prep;mask, sterile gloves, povidone-iodine 7.5% surgical scrub, patient draped.  .  Needle: Touhy needle Needle Gauge: 17.    Needle Length (Inches) 3.5  # of attempts: 1 and # of redirects:  .   . .  Catheter threaded easily  5 cm epidural space.  .   .    Assessment/Narrative  Paresthesias: No.  .  .  Aspiration negative for heme or CSF  . Test dose of 3 mL lidocaine 1.5% w/ 1:200,000 epinephrine at. Test dose negative for signs of intravascular, subdural or intrathecal injection.

## 2017-09-29 NOTE — PROGRESS NOTES
"OB  Patient is more comfortable after epidural but starting to feel pressure with ctxns  /78  Temp 97.7  F (36.5  C) (Axillary)  Ht 1.619 m (5' 3.75\")  Wt 93.4 kg (206 lb)  SpO2 97%  BMI 35.64 kg/m2     Cx: C/100/0-+1  FHT baseline 120 with accels, moderate variability, occasional early decels, category 2 decels recover with positional changes, O2  Ctxns: q1-3\", slightly irregular. Pitocin off secondary to FHT changes    A/P: 25 yo  @ 41 1/7 weeks, PD IOL/GBS(+), active labor  Continue positional changes to assist with fetal descent.  Monitor ctxn pattern with pitocin off-appear adequate at this point.  Labor down for at least 1 hour-then reassess.  Monitor FHT closely      "

## 2017-09-29 NOTE — H&P
No significant change in general health status based on examination of the patient, review of Nursing Admission Database and prenatal record.    EFW: 9lb

## 2017-09-30 PROBLEM — Z98.891 STATUS POST PRIMARY LOW TRANSVERSE CESAREAN SECTION: Status: ACTIVE | Noted: 2017-09-30

## 2017-09-30 LAB
HGB BLD-MCNC: 10.9 G/DL (ref 11.7–15.7)
T PALLIDUM IGG+IGM SER QL: NEGATIVE

## 2017-09-30 PROCEDURE — 25000125 ZZHC RX 250: Performed by: OBSTETRICS & GYNECOLOGY

## 2017-09-30 PROCEDURE — 85018 HEMOGLOBIN: CPT | Performed by: OBSTETRICS & GYNECOLOGY

## 2017-09-30 PROCEDURE — 25000128 H RX IP 250 OP 636: Performed by: NURSE ANESTHETIST, CERTIFIED REGISTERED

## 2017-09-30 PROCEDURE — 25000128 H RX IP 250 OP 636: Performed by: OBSTETRICS & GYNECOLOGY

## 2017-09-30 PROCEDURE — 12000037 ZZH R&B POSTPARTUM INTERMEDIATE

## 2017-09-30 PROCEDURE — 36415 COLL VENOUS BLD VENIPUNCTURE: CPT | Performed by: OBSTETRICS & GYNECOLOGY

## 2017-09-30 PROCEDURE — 25000132 ZZH RX MED GY IP 250 OP 250 PS 637: Performed by: OBSTETRICS & GYNECOLOGY

## 2017-09-30 RX ORDER — ACETAMINOPHEN 325 MG/1
975 TABLET ORAL EVERY 8 HOURS
Status: COMPLETED | OUTPATIENT
Start: 2017-09-30 | End: 2017-10-02

## 2017-09-30 RX ORDER — IBUPROFEN 400 MG/1
400-800 TABLET, FILM COATED ORAL EVERY 6 HOURS PRN
Status: DISCONTINUED | OUTPATIENT
Start: 2017-09-30 | End: 2017-10-04

## 2017-09-30 RX ORDER — DEXTROSE, SODIUM CHLORIDE, SODIUM LACTATE, POTASSIUM CHLORIDE, AND CALCIUM CHLORIDE 5; .6; .31; .03; .02 G/100ML; G/100ML; G/100ML; G/100ML; G/100ML
INJECTION, SOLUTION INTRAVENOUS CONTINUOUS
Status: DISCONTINUED | OUTPATIENT
Start: 2017-09-30 | End: 2017-10-05 | Stop reason: CLARIF

## 2017-09-30 RX ORDER — NALBUPHINE HYDROCHLORIDE 10 MG/ML
2.5-5 INJECTION, SOLUTION INTRAMUSCULAR; INTRAVENOUS; SUBCUTANEOUS EVERY 6 HOURS PRN
Status: DISCONTINUED | OUTPATIENT
Start: 2017-09-30 | End: 2017-10-01 | Stop reason: CLARIF

## 2017-09-30 RX ORDER — MORPHINE SULFATE 1 MG/ML
3 INJECTION, SOLUTION EPIDURAL; INTRATHECAL; INTRAVENOUS ONCE
Status: DISCONTINUED | OUTPATIENT
Start: 2017-09-30 | End: 2017-10-01 | Stop reason: CLARIF

## 2017-09-30 RX ORDER — OXYTOCIN 10 [USP'U]/ML
10 INJECTION, SOLUTION INTRAMUSCULAR; INTRAVENOUS
Status: DISCONTINUED | OUTPATIENT
Start: 2017-09-30 | End: 2017-10-06 | Stop reason: HOSPADM

## 2017-09-30 RX ORDER — LIDOCAINE 40 MG/G
CREAM TOPICAL
Status: DISCONTINUED | OUTPATIENT
Start: 2017-09-30 | End: 2017-10-01 | Stop reason: CLARIF

## 2017-09-30 RX ORDER — MISOPROSTOL 200 UG/1
400 TABLET ORAL
Status: DISCONTINUED | OUTPATIENT
Start: 2017-09-30 | End: 2017-10-06 | Stop reason: HOSPADM

## 2017-09-30 RX ORDER — LANOLIN 100 %
OINTMENT (GRAM) TOPICAL
Status: DISCONTINUED | OUTPATIENT
Start: 2017-09-30 | End: 2017-10-06 | Stop reason: HOSPADM

## 2017-09-30 RX ORDER — NALOXONE HYDROCHLORIDE 0.4 MG/ML
.1-.4 INJECTION, SOLUTION INTRAMUSCULAR; INTRAVENOUS; SUBCUTANEOUS
Status: DISCONTINUED | OUTPATIENT
Start: 2017-09-30 | End: 2017-10-01 | Stop reason: CLARIF

## 2017-09-30 RX ORDER — DIPHENHYDRAMINE HYDROCHLORIDE 50 MG/ML
25 INJECTION INTRAMUSCULAR; INTRAVENOUS EVERY 6 HOURS PRN
Status: DISCONTINUED | OUTPATIENT
Start: 2017-09-30 | End: 2017-10-06 | Stop reason: HOSPADM

## 2017-09-30 RX ORDER — DIPHENHYDRAMINE HCL 25 MG
25 CAPSULE ORAL EVERY 6 HOURS PRN
Status: DISCONTINUED | OUTPATIENT
Start: 2017-09-30 | End: 2017-10-06 | Stop reason: HOSPADM

## 2017-09-30 RX ORDER — LIDOCAINE 40 MG/G
CREAM TOPICAL
Status: DISCONTINUED | OUTPATIENT
Start: 2017-09-30 | End: 2017-10-06 | Stop reason: HOSPADM

## 2017-09-30 RX ORDER — EPHEDRINE SULFATE 50 MG/ML
5 INJECTION, SOLUTION INTRAMUSCULAR; INTRAVENOUS; SUBCUTANEOUS
Status: DISCONTINUED | OUTPATIENT
Start: 2017-09-30 | End: 2017-10-01 | Stop reason: CLARIF

## 2017-09-30 RX ORDER — BISACODYL 10 MG
10 SUPPOSITORY, RECTAL RECTAL DAILY PRN
Status: DISCONTINUED | OUTPATIENT
Start: 2017-10-02 | End: 2017-10-06 | Stop reason: HOSPADM

## 2017-09-30 RX ORDER — FENTANYL CITRATE 50 UG/ML
INJECTION, SOLUTION INTRAMUSCULAR; INTRAVENOUS PRN
Status: DISCONTINUED | OUTPATIENT
Start: 2017-09-30 | End: 2017-09-30

## 2017-09-30 RX ORDER — ACETAMINOPHEN 325 MG/1
650 TABLET ORAL EVERY 4 HOURS PRN
Status: DISCONTINUED | OUTPATIENT
Start: 2017-10-03 | End: 2017-10-06 | Stop reason: HOSPADM

## 2017-09-30 RX ORDER — FENTANYL CITRATE 50 UG/ML
INJECTION, SOLUTION INTRAMUSCULAR; INTRAVENOUS
Status: DISCONTINUED
Start: 2017-09-30 | End: 2017-09-30 | Stop reason: HOSPADM

## 2017-09-30 RX ORDER — OXYCODONE HYDROCHLORIDE 5 MG/1
5-10 TABLET ORAL
Status: DISCONTINUED | OUTPATIENT
Start: 2017-09-30 | End: 2017-10-06 | Stop reason: HOSPADM

## 2017-09-30 RX ORDER — HYDROCORTISONE 2.5 %
CREAM (GRAM) TOPICAL 3 TIMES DAILY PRN
Status: DISCONTINUED | OUTPATIENT
Start: 2017-09-30 | End: 2017-10-06 | Stop reason: HOSPADM

## 2017-09-30 RX ORDER — OXYTOCIN/0.9 % SODIUM CHLORIDE 30/500 ML
100 PLASTIC BAG, INJECTION (ML) INTRAVENOUS CONTINUOUS
Status: DISCONTINUED | OUTPATIENT
Start: 2017-09-30 | End: 2017-10-01 | Stop reason: CLARIF

## 2017-09-30 RX ORDER — NALOXONE HYDROCHLORIDE 0.4 MG/ML
.1-.4 INJECTION, SOLUTION INTRAMUSCULAR; INTRAVENOUS; SUBCUTANEOUS
Status: DISCONTINUED | OUTPATIENT
Start: 2017-09-30 | End: 2017-10-06 | Stop reason: HOSPADM

## 2017-09-30 RX ORDER — ONDANSETRON 2 MG/ML
4 INJECTION INTRAMUSCULAR; INTRAVENOUS EVERY 6 HOURS PRN
Status: DISCONTINUED | OUTPATIENT
Start: 2017-09-30 | End: 2017-10-06 | Stop reason: HOSPADM

## 2017-09-30 RX ORDER — OXYTOCIN/0.9 % SODIUM CHLORIDE 30/500 ML
340 PLASTIC BAG, INJECTION (ML) INTRAVENOUS CONTINUOUS PRN
Status: DISCONTINUED | OUTPATIENT
Start: 2017-09-30 | End: 2017-10-06 | Stop reason: HOSPADM

## 2017-09-30 RX ORDER — AMOXICILLIN 250 MG
1-2 CAPSULE ORAL 2 TIMES DAILY
Status: DISCONTINUED | OUTPATIENT
Start: 2017-09-30 | End: 2017-10-06 | Stop reason: HOSPADM

## 2017-09-30 RX ORDER — KETOROLAC TROMETHAMINE 30 MG/ML
INJECTION, SOLUTION INTRAMUSCULAR; INTRAVENOUS
Status: DISCONTINUED
Start: 2017-09-30 | End: 2017-09-30 | Stop reason: HOSPADM

## 2017-09-30 RX ORDER — KETOROLAC TROMETHAMINE 30 MG/ML
30 INJECTION, SOLUTION INTRAMUSCULAR; INTRAVENOUS EVERY 6 HOURS
Status: COMPLETED | OUTPATIENT
Start: 2017-09-30 | End: 2017-09-30

## 2017-09-30 RX ORDER — SIMETHICONE 80 MG
80 TABLET,CHEWABLE ORAL 4 TIMES DAILY PRN
Status: DISCONTINUED | OUTPATIENT
Start: 2017-09-30 | End: 2017-10-06 | Stop reason: HOSPADM

## 2017-09-30 RX ORDER — HYDROMORPHONE HYDROCHLORIDE 1 MG/ML
.3-.5 INJECTION, SOLUTION INTRAMUSCULAR; INTRAVENOUS; SUBCUTANEOUS EVERY 30 MIN PRN
Status: DISCONTINUED | OUTPATIENT
Start: 2017-09-30 | End: 2017-10-06 | Stop reason: HOSPADM

## 2017-09-30 RX ADMIN — KETOROLAC TROMETHAMINE 30 MG: 30 INJECTION, SOLUTION INTRAMUSCULAR at 19:17

## 2017-09-30 RX ADMIN — KETOROLAC TROMETHAMINE 30 MG: 30 INJECTION, SOLUTION INTRAMUSCULAR at 13:11

## 2017-09-30 RX ADMIN — KETOROLAC TROMETHAMINE 30 MG: 30 INJECTION, SOLUTION INTRAMUSCULAR at 07:14

## 2017-09-30 RX ADMIN — PROPOFOL 10 MG: 10 INJECTION, EMULSION INTRAVENOUS at 00:06

## 2017-09-30 RX ADMIN — SODIUM CHLORIDE, SODIUM LACTATE, POTASSIUM CHLORIDE, CALCIUM CHLORIDE AND DEXTROSE MONOHYDRATE: 5; 600; 310; 30; 20 INJECTION, SOLUTION INTRAVENOUS at 08:52

## 2017-09-30 RX ADMIN — ACETAMINOPHEN 975 MG: 325 TABLET, FILM COATED ORAL at 20:27

## 2017-09-30 RX ADMIN — KETOROLAC TROMETHAMINE 30 MG: 30 INJECTION, SOLUTION INTRAMUSCULAR at 01:05

## 2017-09-30 RX ADMIN — SENNOSIDES AND DOCUSATE SODIUM 1 TABLET: 8.6; 5 TABLET ORAL at 20:27

## 2017-09-30 RX ADMIN — OXYTOCIN-SODIUM CHLORIDE 0.9% IV SOLN 30 UNIT/500ML 100 ML/HR: 30-0.9/5 SOLUTION at 03:53

## 2017-09-30 RX ADMIN — ACETAMINOPHEN 975 MG: 325 TABLET, FILM COATED ORAL at 04:37

## 2017-09-30 RX ADMIN — FENTANYL CITRATE 50 MCG: 50 INJECTION, SOLUTION INTRAMUSCULAR; INTRAVENOUS at 00:37

## 2017-09-30 RX ADMIN — SENNOSIDES AND DOCUSATE SODIUM 1 TABLET: 8.6; 5 TABLET ORAL at 07:14

## 2017-09-30 RX ADMIN — ACETAMINOPHEN 975 MG: 325 TABLET, FILM COATED ORAL at 13:11

## 2017-09-30 NOTE — ANESTHESIA POSTPROCEDURE EVALUATION
Patient: Manpreet Zabala    * No procedures listed *    Diagnosis:* No pre-op diagnosis entered *  Diagnosis Additional Information: No value filed.    Anesthesia Type:  No value filed.    Note:  Anesthesia Post Evaluation    Patient location during evaluation: Bedside  Patient participation: Able to fully participate in evaluation  Level of consciousness: awake and alert     Anesthetic complications: None    Comments: Pt doing well.  Denies epidural-related complaints.        Last vitals:  Vitals:    09/30/17 1200 09/30/17 1311 09/30/17 1400   BP:  119/71    Resp: 16 16 16   Temp:  36.6  C (97.8  F)    SpO2: 100% 100% 99%         Electronically Signed By: JUNE JOE  September 30, 2017  2:51 PM

## 2017-09-30 NOTE — PROGRESS NOTES
[facility] Obstetrics Post-Op / Progress Note         Assessment and Plan:    Assessment:   Post-operative day #1 (time of delivery 2344 last night)  Low transverse primary  section  L&D complications: Arrest of descent; maternal fever      No immediate surgical complications identified.  No excessive bleeding  Pain well-controlled.      Plan:   Ambulation encouraged  Pain control measures as needed  Smith catheter out later today           Interval History:   Doing well.  Pain is well-controlled.  No fevers.  No history of wound drainage, warmth or significant erythema.  Good appetite.  Denies chest pain, shortness of breath, nausea or vomiting.  Not yet ambulatory.            Significant Problems:    None          Review of Systems:    The patient denies any chest pain, shortness of breath, excessive pain, fever, chills, purulent drainage from the wound, nausea or vomiting.          Medications:   All medications related to the patient's surgery have been reviewed          Physical Exam:     All vitals stable  Patient Vitals for the past 8 hrs:   BP Temp Temp src Heart Rate Resp SpO2   17 0712 117/69 98.3  F (36.8  C) Oral 93 14 97 %   17 0630 116/66 98.6  F (37  C) Oral 95 14 97 %   17 0525 121/67 98.6  F (37  C) Oral 93 14 96 %   17 0420 112/68 98.3  F (36.8  C) Oral 92 14 96 %   17 0315 (!) 134/91 97.7  F (36.5  C) Oral 96 14 96 %   17 0230 118/68 - - - 18 98 %   17 0215 126/79 - - 101 13 96 %   17 0200 118/76 - - 90 20 96 %   17 0145 95/75 - - 89 20 97 %   17 0130 135/59 - - 87 15 98 %   17 0115 123/86 - - 84 14 98 %   17 0100 109/58 - - 86 28 99 %   17 0045 111/66 98.1  F (36.7  C) Oral - 18 98 %     Wound covered with clean and dry bandage with minimal or no drainage.  Surrounding skin with minimal erythema.          Data:     All laboratory data related to this surgery reviewed  Hemoglobin   Date Value Ref Range  Status   09/29/2017 13.3 11.7 - 15.7 g/dL Final     No imaging studies have been ordered  Robert Del Valle MD

## 2017-09-30 NOTE — ANESTHESIA CARE TRANSFER NOTE
Patient: Manpreet Zabala    Procedure(s):   - Wound Class: II-Clean Contaminated    Diagnosis: pregnancy  Diagnosis Additional Information: No value filed.    Anesthesia Type:   Epidural     Note:  Airway :Room Air  Patient transferred to:Labor and Delivery  Comments:   Transferred to  RN. Patient awake and verbal. Spontaneous resp and on room air. Monitors and alarms on. VSS. Report given.      Vitals: (Last set prior to Anesthesia Care Transfer)    CRNA VITALS  9/30/2017 0011 - 9/30/2017 0044      9/30/2017             Resp Rate (set): 10                Electronically Signed By: ADRIENNE Corcoran CRNA  September 30, 2017  12:44 AM

## 2017-09-30 NOTE — ANESTHESIA PREPROCEDURE EVALUATION
Anesthesia Evaluation     . Pt has had prior anesthetic.     No history of anesthetic complications          ROS/MED HX    ENT/Pulmonary:      (-) sleep apnea   Neurologic:       Cardiovascular:         METS/Exercise Tolerance:     Hematologic:         Musculoskeletal:         GI/Hepatic:        (-) GERD   Renal/Genitourinary:         Endo:         Psychiatric:         Infectious Disease:         Malignancy:         Other:                     Physical Exam  Normal systems: cardiovascular, pulmonary and dental    Airway   Mallampati: II  TM distance: >3 FB  Neck ROM: full    Dental     Cardiovascular       Pulmonary                     Anesthesia Plan      History & Physical Review  History and physical reviewed and following examination; no interval change.    ASA Status:  2 .    NPO Status:  > 8 hours    Plan for Epidural   PONV prophylaxis:  Ondansetron (or other 5HT-3)  Epidural in situ working well    Morphine in epidural to help decrease postop pain      Postoperative Care  Postoperative pain management:  Neuraxial analgesia and IV analgesics.      Consents  Anesthetic plan, risks, benefits and alternatives discussed with:  Patient..                          .

## 2017-09-30 NOTE — LACTATION NOTE
Initial Lactation visit. Hand out given. Recommend unlimited, frequent breast feedings: At least 8 - 12 times every 24 hours. Avoid pacifiers and supplementation with formula unless medically indicated. Explained benefits of holding baby skin on skin to help promote better breastfeeding outcomes. Will revisit as needed.    Pt concerned that infant has not fed well since birth. Infant fussy at the breast at time of visit. Attempted different positions and had pt hand express drops of colostrum but infant still fussy. Attempted to have infant suck on gloved finger. Suck very uncoordinated and weak. Encouraged parents to have infant suck on their clean fingers. Jarred pacifier given as well for use until suck more coordinated.     Skye Whitney RN IBCLC

## 2017-09-30 NOTE — L&D DELIVERY NOTE
Delivery Summary    Manpreet Zabala MRN# [id number]   Age: 26 year old YOB: 1990     ASSESSMENT & PLAN: 41 1/7 week IUP, delivered, failed IOL, arrest of descent, nonreassuring FHT        Antibiotics received during labor?:  Yes   Reason for Antibiotics:  GBS   Antibiotics received for GBS:  Penicillin      Rupture identifier:  Rupture 1   Rupture date/time: 17 1335   Rupture type:  Artificial Rupture of Membranes   Fluid color:  Clear      Delivery/Placenta Date and Time    Delivery Date:  17 Delivery Time:  11:44 PM      Labor Events and Shoulder Dystocia    Fetal Tracing Prior to Delivery:  Category 2   Fetal Tracing Comments:  repetitive deep variable decels during stage 2   Shoulder dystocia present?:  Neg            Delivery (Maternal) (Provider to Complete) ([id number])    Episiotomy:  None   Perineal lacerations:  None    Vaginal laceration?:  No    Cervical laceration?:  No          Mother's Information  Mother: Manpreet Zabala #[id number]    Start of Mother's Information     IO Blood Loss  17 1144 - 17 0044    Mom's I/O Activity            End of Mother's Information  Mother: Manpreet Zabala #[id number]            Delivery - Provider to Complete ([id number])    Delivering clinician:  DAMON ARIAS   Attempted Delivery Types (Choose all that apply):  Spontaneous Vaginal Delivery   Delivery Type (Choose the 1 that will go to the Birth History):  , Low Transverse                      Specifics:  Primary nulliparius   Other personnel:   Provider Role   BG BERNARD Assistant Surgeon            Placenta    Delayed Cord Clamping:  Done   Removal:  Manual Removal   Disposition:  Hospital disposal      Anesthesia    Method:  Epidural   Cervical dilation at placement:  4-7         Presentation and Position    Presentation:  Vertex    Occiput Posterior                    Damon Arias MD, MD

## 2017-09-30 NOTE — PLAN OF CARE
Problem: Patient Care Overview  Goal: Plan of Care/Patient Progress Review  Outcome: Improving  Vital signs stable. Bleeding wnl. Smith removed, due to void. Incision dressing in place with marked drainage and no change since the beginning of the shift. Saline lock in place. Taking scheduled tylenol and toradol for pain control, denies need for any narcotics at this point but knows they are available. Tolerating a regular diet. Ambulated in room well. Breast feeding okay, infant sleepy and has been doing skin to skin but has had a few fair feedings. Encouraged to call with any questions or concerns. Will continue to monitor.

## 2017-09-30 NOTE — PROGRESS NOTES
"OB  Patient now pushing for 1+ hours, good maternal effort  /80  Temp 102.8  F (39.3  C) (Temporal)  Ht 1.619 m (5' 3.75\")  Wt 93.4 kg (206 lb)  SpO2 95%  BMI 35.64 kg/m2     Cx C/100/0 no descent after pushing  FHT baseline 120 now with recurrent deep variable decels with pushing  Ctxns q2-3\"    A/P: 27 yo  @ 41 1/7 weeks, PD IOL, arrest of descent  Situation reviewed with patient and . Recommend that we proceed with c/s now secondary to arrest of descent, nonreassuring FHT. Questions answered. Consent signed. Will proceed now.    "

## 2017-09-30 NOTE — PROCEDURES
[facility]    OB Brief Operative Note    Pre-operative diagnosis: pregnancy  Intrauterine pregnancy at 41 1/7 weeks gestation  Failed Induction of labor  Arrest of descent  Nonreassuring FHT   Post-operative diagnosis Same   Procedure: Procedure(s):   - Wound Class: II-Clean Contaminated   Surgeon: Josselin Boswell MD, MD   Assistants(s): Robert Del Valle MD   Anesthesia: Epidural    Estimated blood loss: 1000 cc    Total IV fluids: (See anesthesia record)  1700 cc LR   Blood transfusion: No transfusion was given during surgery   Total urine output: (See anesthesia record)  1000ml   Specimens: None   Findings: Live   Male  Cephalic presentation:  occiput posterior  APGARS: 1 minute: 9   5 minute: 9  Weight: 8 lbs 15 oz.   Tubes: normal  Uterus: normal  Ovaries: normal   Complications: None   Condition: Infant stable, transferred to general care nursery  Mother stable, transfered to post-anesthesia recovery   Comments: See dictated operative report for full details [id number]

## 2017-09-30 NOTE — PLAN OF CARE
Problem: Patient Care Overview  Goal: Plan of Care/Patient Progress Review  Outcome: Improving  Vitals stable. Ambulates well. Due to void after ross removed. Working on breastfeeding. Duramorph, toradol, and tylenol for pain control. Declines narcotics. Tolerating general diet. Moved to 432 from 412.

## 2017-09-30 NOTE — PLAN OF CARE
Problem: Patient Care Overview  Goal: Plan of Care/Patient Progress Review  Outcome: Improving  Pt. admitted from L&D via bed.. Pt. arrived with baby and was accompanied by  and arrived with personal belongings. Report was taken from Zarina in L&D.  Pt. is A&Ox3 and VSS on RA. Fundus is firm and midline.  Vaginal bleeding is appropriate.   Pt. c/o 2-4/10 pain. Pt. denied nausea, CP, SOB, lightheadedness, or dizziness. PIV patent and infusing.  Smith patent and draining.  Dressing to lower abdomen CDI.  Pneumoboots in place to BLE.  Pt. oriented to the room and call light system.     VSS.  Fundus firm, bleeding appropriate.  Pain well-managed with Tylenol and Ketorolac.  Incision covered with scant drainage.  Working on breastfeeding and learning more about infant cares.  Resting in bed, due to get up at 0830.  Catheter in place and draining.  Bowel sounds present and active in all quadrants.  LoÃ­za and attentive towards infant.   present and supportive at bedside.  Continue to monitor and educate as appropriate.

## 2017-09-30 NOTE — OP NOTE
PREOPERATIVE DIAGNOSES:   1.  Intrauterine pregnancy at 41-1/7 weeks' gestation.   2.  Failed induction of labor for post dates.   3.  Arrest of descent in second stage of labor.   4.  Non-reassuring fetal heart tracing.      POSTOPERATIVE DIAGNOSES:   1.  Intrauterine pregnancy at 41-1/7 weeks' gestation.   2.  Failed induction of labor for post dates.   3.  Arrest of descent in second stage of labor.   4.  Non-reassuring fetal heart tracing.      PROCEDURE:  Primary low transverse  section.      SURGEON:  Josselin Boswell MD       ASSISTANT SURGEON:  Robert Del Valle MD      ANESTHESIA:  Epidural.      INTRAVENOUS FLUIDS:  1,700 cc lactated Ringer's.      ESTIMATED BLOOD LOSS:  1,000 cc.      TOTAL URINE OUTPUT:  1,000 cc blood-tinged urine.      FINDINGS:     1.  Viable male infant in cephalic presentation, occiput posterior.   2.  Weight 8 pounds 15 ounces.   3.  Apgars 9 and 9.   4.  Normal-appearing uterus, tubes and ovaries.      COMPLICATIONS:  None.      SPECIMENS SENT:  None.      PROPHYLAXIS:  The patient was given antibiotics during labor for GBS prophylaxis and was also given Ancef at the beginning of the procedure.      INDICATIONS:  The patient Manpreet Zabala is a 26-year-old woman who is a  1, para 0 admitted at 41-1/7 weeks' gestation for post dates induction of labor.  Pregnancy was complicated by polyhydramnios noted at 36 weeks; this did resolve by 38 weeks.  Estimated fetal weight was approximately 9 pounds.  The patient was noted to be GBS positive.        The patient was admitted and was started on IV antibiotics for GBS prophylaxis.  Artificial rupture of membranes was performed when she was approximately 5 cm dilated.  Fluid was clear at that time, and the patient was receiving Pitocin augmentation.  She did achieve complete dilation at which time the fetal station was still 0.  The patient labored down for approximately 3 hours.  During this time there were some changes in the  fetal heart rate with occasional early decelerations, occasional variable decelerations, but with moderate variability.  The patient pushed for approximately 1-1/2 hours, and during that time she had a maximum temperature of 102.8.  However, there was no evidence of maternal tachycardia or fetal tachycardia.  The patient continued to push as she was motivated for a vaginal delivery.  However, eventually the fetal heart tracing was non-reassuring with recurrent deep variable decelerations, and the patient did not progress beyond 0 station.  It was advised that we proceed with primary  section for arrest of descent and non-reassuring fetal heart tracing.  The patient consented to  section.      DESCRIPTION OF PROCEDURE:  The patient was taken to the operating room on 2017 where epidural medication was dosed for an abdominal procedure.  A Smith catheter was already in place to continuous drainage.  The patient was prepped and draped in the normal sterile fashion for an abdominal procedure, and an appropriate time-out was observed.        A Pfannenstiel incision was made approximately 2 cm above the symphysis pubis in the midline, and the incision was extended down to the rectus fascia.  Rectus fascia was nicked in the midline and  bilaterally.  Rectus fascia was  off the rectus muscles with a combination of sharp and blunt dissection.  Rectus muscles were  in the midline.  Peritoneum was then entered sharply and the incision extended inferiorly and superiorly with good visualization of the bladder.  Lower uterine segment was well-developed.  Bladder flap was created with a combination of sharp and blunt dissection.        Lower uterine segment was incised with a scalpel.  This incision was extended inferiorly and superiorly.  We attempted to deliver the fetal head out of the pelvis.  It was noted to be in the occiput posterior presentation.  With assistance from below  the head was then pushed up out of the vagina, and the infant was then delivered out of the uterine incision.  The remainder of the infant was then delivered.  A vigorous cry was noted.  This was a viable male infant weighing 8 pounds 15 ounces with Apgars of   9 and 9.  Cord was doubly clamped and cut, and the infant was handed off to the waiting Nurse.        The placenta was delivered manually.  The uterus was exteriorized and cleared of all clots and debris.  Uterine incision was examined, and there was noted to be a small extension of the uterine incision down onto the right side.  The angles of the incision were clearly identified.  The uterine incision was  reapproximated in 2 layers with 0 chromic.  The uterus was then replaced in the abdomen.  The gutters were cleared of all clots and debris.  The angles of the uterine incision were re-examined, and several interrupted sutures were placed in the angles for hemostasis.  The bladder flap was then re-approximated with a running stitch of 3-0 Vicryl.  Adequate hemostasis was noted at this time.        The rectus muscles were reapproximated with interrupted sutures of 0 chromic.  The fascia was reapproximated with a running 0 Vicryl.  The subcutaneous tissue was reapproximated with    a running 3-0 plain, and the skin was closed in a subcuticular fashion with 4-0 Vicryl.  Steri-Strips were placed.  All sponge, lap and needle counts were correct x2.  The patient was taken to the Recovery Room in stable condition.  Both Mother and Baby were in stable condition.         DAMON ARIAS MD             D: 2017 00:55   T: 2017 04:29   MT: [id number]      Name:     IAN CABA   MRN:      [id number]        Account:        [id number]   :      1990           Procedure Date: 2017      Document: [id number]

## 2017-09-30 NOTE — PROGRESS NOTES
"OB  Patient comfortable, feeling occasional pressure. Laboring down since 1805  /80  Temp 99.8  F (37.7  C) (Temporal)  Ht 1.619 m (5' 3.75\")  Wt 93.4 kg (206 lb)  SpO2 95%  BMI 35.64 kg/m2   Materal temp 102  Cx C/100/0 with caput +1 (essentially no change in descent)  FHT baseline 120 with accels, occasional variable decels, occasional late decels, category 2-moderate variability, resolution with positional changes  Ctxns q2-3\", pitocin @ 3 mu    A/P: 25 yo  @ 41 1/7 weeks PD IOL/GBS(+)  Begin trial of pushing now to assess if there is any fetal descent.  Maternal temp elevated with no other signs of chorio. Give patient Tylenol x1 dose  Reviewed with patient and  concern regarding no change in fetal descent in spite of laboring down and positional changes over past 3 hours. Advised them that FHT changes may be more concerning with pushing. Will monitor for progress closely. Reviewed indications for c/s including arrest of descent, nonreassuring FHT.     "

## 2017-09-30 NOTE — ANESTHESIA POSTPROCEDURE EVALUATION
Patient: Manpreet Zabala    Procedure(s):   - Wound Class: II-Clean Contaminated    Diagnosis:pregnancy  Diagnosis Additional Information: No value filed.    Anesthesia Type:  Epidural    Note:  Anesthesia Post Evaluation    Patient location during evaluation: PACU  Patient participation: Able to fully participate in evaluation  Level of consciousness: awake  Pain management: adequate  Airway patency: patent  Cardiovascular status: acceptable  Respiratory status: acceptable  Hydration status: acceptable  PONV: controlled     Anesthetic complications: None          Last vitals:  Vitals:    09/29/17 2121 09/29/17 2200 09/29/17 2230   BP:  130/61    Temp: 39.3  C (102.8  F)  36.7  C (98.1  F)   SpO2:            Electronically Signed By: Jason Mascorro MD  September 30, 2017  1:07 AM

## 2017-10-01 PROCEDURE — 25000132 ZZH RX MED GY IP 250 OP 250 PS 637: Performed by: OBSTETRICS & GYNECOLOGY

## 2017-10-01 PROCEDURE — 12000037 ZZH R&B POSTPARTUM INTERMEDIATE

## 2017-10-01 RX ORDER — CETIRIZINE HYDROCHLORIDE 10 MG/1
10 TABLET ORAL DAILY
Status: DISCONTINUED | OUTPATIENT
Start: 2017-10-01 | End: 2017-10-06 | Stop reason: HOSPADM

## 2017-10-01 RX ORDER — PRENATAL VIT/IRON FUM/FOLIC AC 27MG-0.8MG
1 TABLET ORAL DAILY
Status: DISCONTINUED | OUTPATIENT
Start: 2017-10-01 | End: 2017-10-06 | Stop reason: HOSPADM

## 2017-10-01 RX ADMIN — ACETAMINOPHEN 975 MG: 325 TABLET, FILM COATED ORAL at 13:06

## 2017-10-01 RX ADMIN — ACETAMINOPHEN 975 MG: 325 TABLET, FILM COATED ORAL at 04:58

## 2017-10-01 RX ADMIN — OXYCODONE HYDROCHLORIDE 5 MG: 5 TABLET ORAL at 13:29

## 2017-10-01 RX ADMIN — PRENATAL VIT W/ FE FUMARATE-FA TAB 27-0.8 MG 1 TABLET: 27-0.8 TAB at 10:13

## 2017-10-01 RX ADMIN — OXYCODONE HYDROCHLORIDE 5 MG: 5 TABLET ORAL at 00:14

## 2017-10-01 RX ADMIN — SENNOSIDES AND DOCUSATE SODIUM 2 TABLET: 8.6; 5 TABLET ORAL at 20:41

## 2017-10-01 RX ADMIN — ACETAMINOPHEN 975 MG: 325 TABLET, FILM COATED ORAL at 20:40

## 2017-10-01 RX ADMIN — IBUPROFEN 800 MG: 400 TABLET ORAL at 07:12

## 2017-10-01 RX ADMIN — IBUPROFEN 800 MG: 400 TABLET ORAL at 00:48

## 2017-10-01 RX ADMIN — OXYCODONE HYDROCHLORIDE 5 MG: 5 TABLET ORAL at 10:13

## 2017-10-01 RX ADMIN — IBUPROFEN 800 MG: 400 TABLET ORAL at 13:06

## 2017-10-01 RX ADMIN — OXYCODONE HYDROCHLORIDE 5 MG: 5 TABLET ORAL at 16:37

## 2017-10-01 RX ADMIN — OXYCODONE HYDROCHLORIDE 5 MG: 5 TABLET ORAL at 20:53

## 2017-10-01 RX ADMIN — IBUPROFEN 800 MG: 400 TABLET ORAL at 19:17

## 2017-10-01 RX ADMIN — CETIRIZINE HYDROCHLORIDE 10 MG: 10 TABLET, FILM COATED ORAL at 10:13

## 2017-10-01 RX ADMIN — VITAMIN D, TAB 1000IU (100/BT) 2000 UNITS: 25 TAB at 10:13

## 2017-10-01 RX ADMIN — SENNOSIDES AND DOCUSATE SODIUM 2 TABLET: 8.6; 5 TABLET ORAL at 07:50

## 2017-10-01 NOTE — PROGRESS NOTES
[facility] Obstetrics Post-Op / Progress Note         Assessment and Plan:    Assessment:   Post-operative day #2  Low transverse primary  section  L&D complications: Arrest of descent; maternal fever      Clean wound without signs of infection.  No immediate surgical complications identified.  No excessive bleeding  Pain well-controlled.      Plan:   Anticipate discharge in 1-2 days           Interval History:   Doing well.  Pain is well-controlled.  No fevers.  No history of wound drainage, warmth or significant erythema.  Good appetite.  Denies chest pain, shortness of breath, nausea or vomiting.  Ambulatory.  Breastfeeding well.            Significant Problems:    None          Review of Systems:    The patient denies any chest pain, shortness of breath, excessive pain, fever, chills, purulent drainage from the wound, nausea or vomiting.          Medications:   All medications related to the patient's surgery have been reviewed          Physical Exam:     All vitals stable  Patient Vitals for the past 12 hrs:   BP Temp Temp src Heart Rate Resp   10/01/17 0748 121/80 98.4  F (36.9  C) Oral 86 16   10/01/17 0244 - 98.6  F (37  C) Oral - 20   10/01/17 0030 136/88 98  F (36.7  C) Oral 89 16   10/01/17 0014 - - - - 16     Wound clean and dry with minimal or no drainage.  Surrounding skin with minimal erythema.          Data:     All laboratory data related to this surgery reviewed  Hemoglobin   Date Value Ref Range Status   2017 10.9 (L) 11.7 - 15.7 g/dL Final   2017 13.3 11.7 - 15.7 g/dL Final     No imaging studies have been ordered  Robert Del Valle MD

## 2017-10-01 NOTE — PLAN OF CARE
Problem: Patient Care Overview  Goal: Plan of Care/Patient Progress Review  Outcome: No Change  VSS, taking tylenol, ibuprofen and oxycodone for pain, has an abd binder. Anxious at times. Fundus firm with scant lochia. Breastfeeding fair, working on getting a good latch so it's not so painful.  present and supportive at bedside. Will continue to monitor.

## 2017-10-01 NOTE — LACTATION NOTE
Routine visit. Manpreet states phong Vigil is improving with breastfeeding. Assisted Manpreet to position and latch Musa at time of visit. Latch looks good overall but Musa's cheeks are dimpling in with feeding. Shield placed and dimpling improved but still was not resolved. Encouraged Manpreet to continue using the shield. Good latch vs poor latch with a shield discussed. Discussed listening for swallows and watching for colostrum to be in the shield after feedings as well. Will revisit.

## 2017-10-01 NOTE — PLAN OF CARE
Problem: Patient Care Overview  Goal: Plan of Care/Patient Progress Review  Outcome: Improving  Vitals stable. Up ad yogi well. Voiding without difficulty. Breastfeeding fair to well. Infant does better with shield. Pain controlled with PO meds. Oxycodone PRN.

## 2017-10-02 PROCEDURE — 25000128 H RX IP 250 OP 636: Performed by: OBSTETRICS & GYNECOLOGY

## 2017-10-02 PROCEDURE — 90686 IIV4 VACC NO PRSV 0.5 ML IM: CPT | Performed by: OBSTETRICS & GYNECOLOGY

## 2017-10-02 PROCEDURE — 25000132 ZZH RX MED GY IP 250 OP 250 PS 637: Performed by: OBSTETRICS & GYNECOLOGY

## 2017-10-02 PROCEDURE — 12000037 ZZH R&B POSTPARTUM INTERMEDIATE

## 2017-10-02 RX ADMIN — IBUPROFEN 800 MG: 400 TABLET ORAL at 14:54

## 2017-10-02 RX ADMIN — OXYCODONE HYDROCHLORIDE 5 MG: 5 TABLET ORAL at 04:30

## 2017-10-02 RX ADMIN — IBUPROFEN 800 MG: 400 TABLET ORAL at 07:24

## 2017-10-02 RX ADMIN — IBUPROFEN 800 MG: 400 TABLET ORAL at 01:13

## 2017-10-02 RX ADMIN — SENNOSIDES AND DOCUSATE SODIUM 2 TABLET: 8.6; 5 TABLET ORAL at 09:21

## 2017-10-02 RX ADMIN — ACETAMINOPHEN 975 MG: 325 TABLET, FILM COATED ORAL at 12:32

## 2017-10-02 RX ADMIN — PRENATAL VIT W/ FE FUMARATE-FA TAB 27-0.8 MG 1 TABLET: 27-0.8 TAB at 09:21

## 2017-10-02 RX ADMIN — VITAMIN D, TAB 1000IU (100/BT) 2000 UNITS: 25 TAB at 09:21

## 2017-10-02 RX ADMIN — CETIRIZINE HYDROCHLORIDE 10 MG: 10 TABLET, FILM COATED ORAL at 09:21

## 2017-10-02 RX ADMIN — ACETAMINOPHEN 975 MG: 325 TABLET, FILM COATED ORAL at 04:30

## 2017-10-02 RX ADMIN — SENNOSIDES AND DOCUSATE SODIUM 2 TABLET: 8.6; 5 TABLET ORAL at 21:17

## 2017-10-02 RX ADMIN — OXYCODONE HYDROCHLORIDE 5 MG: 5 TABLET ORAL at 01:13

## 2017-10-02 RX ADMIN — ACETAMINOPHEN 975 MG: 325 TABLET, FILM COATED ORAL at 21:17

## 2017-10-02 RX ADMIN — OXYCODONE HYDROCHLORIDE 5 MG: 5 TABLET ORAL at 09:20

## 2017-10-02 RX ADMIN — IBUPROFEN 800 MG: 400 TABLET ORAL at 21:17

## 2017-10-02 RX ADMIN — OXYCODONE HYDROCHLORIDE 5 MG: 5 TABLET ORAL at 12:33

## 2017-10-02 RX ADMIN — OXYCODONE HYDROCHLORIDE 5 MG: 5 TABLET ORAL at 21:17

## 2017-10-02 RX ADMIN — INFLUENZA A VIRUS A/MICHIGAN/45/2015 X-275 (H1N1) ANTIGEN (FORMALDEHYDE INACTIVATED), INFLUENZA A VIRUS A/HONG KONG/4801/2014 X-263B (H3N2) ANTIGEN (FORMALDEHYDE INACTIVATED), INFLUENZA B VIRUS B/PHUKET/3073/2013 ANTIGEN (FORMALDEHYDE INACTIVATED), AND INFLUENZA B VIRUS B/BRISBANE/60/2008 ANTIGEN (FORMALDEHYDE INACTIVATED) 0.5 ML: 15; 15; 15; 15 INJECTION, SUSPENSION INTRAMUSCULAR at 12:37

## 2017-10-02 RX ADMIN — OXYCODONE HYDROCHLORIDE 5 MG: 5 TABLET ORAL at 17:36

## 2017-10-02 NOTE — PLAN OF CARE
Problem: Patient Care Overview  Goal: Plan of Care/Patient Progress Review  Patient meeting expected goals for this shift.  Using ibuprofen, tylenol and oxycodone for pain/comfort.  Independent in all self and  cares.  Working on breastfeeding  with and without a shield.   present at bedside and supportive.  Positive bonding behaviors observed.  Continue to monitor and notify MD as needed.

## 2017-10-02 NOTE — PROGRESS NOTES
[facility] Obstetrics Post-Op / Progress Note         Assessment and Plan:    Assessment:   Post-operative day #3  Low transverse primary  section  L&D complications: Arrest of descent; maternal fever      Clean wound without signs of infection.  No immediate surgical complications identified.  No excessive bleeding  Pain well-controlled.  BPs somewhat elevated/labile post delivery      Plan:   Anticipate discharge tomorrow  Would like to continue monitoring blood pressure  Also, staying until tomorrow would allow patient to keep working on breast feeding/pumping           Interval History:   Doing well.  Pain is well-controlled.  No fevers.  No history of wound drainage, warmth or significant erythema.  Good appetite.  Denies chest pain, shortness of breath, nausea or vomiting.  Ambulatory.  Breastfeeding well.            Significant Problems:    None          Review of Systems:    The patient denies any chest pain, shortness of breath, excessive pain, fever, chills, purulent drainage from the wound, nausea or vomiting.          Medications:   All medications related to the patient's surgery have been reviewed          Physical Exam:     All vitals stable  Patient Vitals for the past 12 hrs:   BP Temp Temp src Pulse Resp   10/02/17 0558 (!) 135/95 - - - -   10/02/17 0100 138/90 97.9  F (36.6  C) Oral 89 16   10/01/17 2213 (!) 129/91 97.7  F (36.5  C) Oral 86 16     Wound clean and dry with minimal or no drainage.  Surrounding skin with minimal erythema.          Data:     All laboratory data related to this surgery reviewed  Hemoglobin   Date Value Ref Range Status   2017 10.9 (L) 11.7 - 15.7 g/dL Final   2017 13.3 11.7 - 15.7 g/dL Final     No imaging studies have been ordered  Robert Del Valle MD

## 2017-10-02 NOTE — PLAN OF CARE
Problem: Patient Care Overview  Goal: Plan of Care/Patient Progress Review  Outcome: Improving  VSS, working on breastfeeding q 2-3 hrs with assistance, pumping started this shift due to 11.1% infant weight loss, finger feeding additional EBM following feeds, incision CDI with steri strip, Tylenol, Ibuprofen and Oxycodone given for pain, states satisfaction with pain management, ambulating well,  at bedside, pt appear anxious this shift, encouraged parents to call with questions or concerns, will continue to monitor.

## 2017-10-02 NOTE — PLAN OF CARE
Problem: Patient Care Overview  Goal: Plan of Care/Patient Progress Review  Outcome: No Change  Elevated blood pressures, aware,c/o mild headache this morning,relieved with tylenol,denies vision change, epigastric pain ,ambulating well,incision intact with steri strips,baby breast feeding&supplementing EBM with dropper.Will continue to monitor.

## 2017-10-02 NOTE — LACTATION NOTE
Follow up visit.  Infant latching and feeding well.  Manpreet was able to get baby latched well independently.  Audible swallowing during feeding.  Infant at 11% weight loss.  Manpreet has started pumping after feedings and finger feedign ebm.  Will wait until pediatrician evaluates baby to add additional supplementation.  Reviewed supplement options and donor breast milk.  Will continue to follow.  Kat Gilliam  RN, IBCLC

## 2017-10-03 LAB
ALT SERPL W P-5'-P-CCNC: 33 U/L (ref 0–50)
AST SERPL W P-5'-P-CCNC: 65 U/L (ref 0–45)
URATE SERPL-MCNC: 4.6 MG/DL (ref 2.6–6)

## 2017-10-03 PROCEDURE — 12000037 ZZH R&B POSTPARTUM INTERMEDIATE

## 2017-10-03 PROCEDURE — 25000132 ZZH RX MED GY IP 250 OP 250 PS 637: Performed by: OBSTETRICS & GYNECOLOGY

## 2017-10-03 PROCEDURE — 84450 TRANSFERASE (AST) (SGOT): CPT | Performed by: OBSTETRICS & GYNECOLOGY

## 2017-10-03 PROCEDURE — 84460 ALANINE AMINO (ALT) (SGPT): CPT | Performed by: OBSTETRICS & GYNECOLOGY

## 2017-10-03 PROCEDURE — 84550 ASSAY OF BLOOD/URIC ACID: CPT | Performed by: OBSTETRICS & GYNECOLOGY

## 2017-10-03 PROCEDURE — 36415 COLL VENOUS BLD VENIPUNCTURE: CPT | Performed by: OBSTETRICS & GYNECOLOGY

## 2017-10-03 RX ORDER — LABETALOL 100 MG/1
200 TABLET, FILM COATED ORAL EVERY 8 HOURS SCHEDULED
Status: DISCONTINUED | OUTPATIENT
Start: 2017-10-03 | End: 2017-10-04

## 2017-10-03 RX ADMIN — IBUPROFEN 800 MG: 400 TABLET ORAL at 03:26

## 2017-10-03 RX ADMIN — OXYCODONE HYDROCHLORIDE 5 MG: 5 TABLET ORAL at 08:53

## 2017-10-03 RX ADMIN — PRENATAL VIT W/ FE FUMARATE-FA TAB 27-0.8 MG 1 TABLET: 27-0.8 TAB at 08:52

## 2017-10-03 RX ADMIN — LABETALOL HYDROCHLORIDE 200 MG: 100 TABLET, FILM COATED ORAL at 08:53

## 2017-10-03 RX ADMIN — SENNOSIDES AND DOCUSATE SODIUM 2 TABLET: 8.6; 5 TABLET ORAL at 08:52

## 2017-10-03 RX ADMIN — IBUPROFEN 800 MG: 400 TABLET ORAL at 20:29

## 2017-10-03 RX ADMIN — SENNOSIDES AND DOCUSATE SODIUM 1 TABLET: 8.6; 5 TABLET ORAL at 20:29

## 2017-10-03 RX ADMIN — OXYCODONE HYDROCHLORIDE 5 MG: 5 TABLET ORAL at 20:29

## 2017-10-03 RX ADMIN — ACETAMINOPHEN 650 MG: 325 TABLET, FILM COATED ORAL at 20:29

## 2017-10-03 RX ADMIN — OXYCODONE HYDROCHLORIDE 5 MG: 5 TABLET ORAL at 13:41

## 2017-10-03 RX ADMIN — ACETAMINOPHEN 650 MG: 325 TABLET, FILM COATED ORAL at 08:53

## 2017-10-03 RX ADMIN — OXYCODONE HYDROCHLORIDE 5 MG: 5 TABLET ORAL at 23:25

## 2017-10-03 RX ADMIN — IBUPROFEN 800 MG: 400 TABLET ORAL at 13:41

## 2017-10-03 RX ADMIN — CETIRIZINE HYDROCHLORIDE 10 MG: 10 TABLET, FILM COATED ORAL at 08:52

## 2017-10-03 RX ADMIN — OXYCODONE HYDROCHLORIDE 5 MG: 5 TABLET ORAL at 17:01

## 2017-10-03 RX ADMIN — OXYCODONE HYDROCHLORIDE 5 MG: 5 TABLET ORAL at 01:30

## 2017-10-03 RX ADMIN — LABETALOL HYDROCHLORIDE 200 MG: 100 TABLET, FILM COATED ORAL at 14:31

## 2017-10-03 RX ADMIN — VITAMIN D, TAB 1000IU (100/BT) 2000 UNITS: 25 TAB at 08:53

## 2017-10-03 RX ADMIN — LABETALOL HYDROCHLORIDE 200 MG: 100 TABLET, FILM COATED ORAL at 22:00

## 2017-10-03 NOTE — PLAN OF CARE
Problem: Patient Care Overview  Goal: Plan of Care/Patient Progress Review  Outcome: Improving  Bp elevated 146/103, denies headache, blurred vision, and epigastric pain. Pt appears to be anxious overnight and tearful about baby's weight loss (13%). Baby breastfeeding well every 2-3 hours. Discussed supplementation with Mom and . Parents agreed to supplement with DM. Mom pumping after feedings and using SS to supplement DM and EBM. Patient independent with self cares. Pain managed with tylenol, ibuprofen and oxycodone prn.

## 2017-10-03 NOTE — PLAN OF CARE
Problem: Patient Care Overview  Goal: Plan of Care/Patient Progress Review  Outcome: No Change  Elevated blood pressures,see flow sheet,started on labetalol today,c/o headache on&off,patients status&lab results notified to ,order received to recheck lab tomorrow,ambulating well,incision intact with steri strips,pain control with tylenol,ibuprofen&oxycodone,baby breast feeding well,having trouble latching on right breast,using shield on right side,pumping&finger feeding EBM&donor milk.Will continue to monitor.

## 2017-10-03 NOTE — PROGRESS NOTES
POD #4    Generally feeling well, not had a bowel movement yet, no nausea.  No blurred vision or atypical abdominal pain.    I discussed her blood pressure.  Had elevated bps in late pregnancy.  See vitals    /103, 142/95, 147/101          98    Abdomen soft.  Incision good    LE 1-2 plus edema, normal DTR      POD #4  Post partum hypertension    Will need to stay  Start labetalol 200 mg tid    Will recheck labs

## 2017-10-03 NOTE — PLAN OF CARE
Problem: Patient Care Overview  Goal: Plan of Care/Patient Progress Review  Patient meeting expected goals for this shift.  Using ibuprofen, tylenol and oxycodone for pain/comfort.  BPs remain elevated - see flowsheet.  Independent in all self and  cares.  Working on breastfeeding  and pumping/hand expressing.   present at bedside and supportive.  Positive bonding behaviors observed.  Continue to monitor and notify MD as needed.

## 2017-10-03 NOTE — LACTATION NOTE
Follow up visit.  Infant at 13% weight loss today, has started supplementing more.  Milk is starting to come in.  Breasts are staton and pumping increasing amounts.  Infant latching and feeding well.  Used shield on R as baby was having difficulty latching.  R nipple is slightly shorter and more difficult to latch to.  Finger feeding after ebm and donor milk.  Pumping every feeding.  Will continue to follow as needed.  Kat Gilliam  RN, IBCLC

## 2017-10-04 LAB
ALT SERPL W P-5'-P-CCNC: 33 U/L (ref 0–50)
ANION GAP SERPL CALCULATED.3IONS-SCNC: 7 MMOL/L (ref 3–14)
AST SERPL W P-5'-P-CCNC: 51 U/L (ref 0–45)
AST SERPL W P-5'-P-CCNC: 63 U/L (ref 0–45)
BUN SERPL-MCNC: 12 MG/DL (ref 7–30)
CALCIUM SERPL-MCNC: 8.8 MG/DL (ref 8.5–10.1)
CHLORIDE SERPL-SCNC: 109 MMOL/L (ref 94–109)
CO2 SERPL-SCNC: 26 MMOL/L (ref 20–32)
CREAT SERPL-MCNC: 0.72 MG/DL (ref 0.52–1.04)
CREAT UR-MCNC: 11 MG/DL
ERYTHROCYTE [DISTWIDTH] IN BLOOD BY AUTOMATED COUNT: 13.4 % (ref 10–15)
GFR SERPL CREATININE-BSD FRML MDRD: >90 ML/MIN/1.7M2
GLUCOSE SERPL-MCNC: 80 MG/DL (ref 70–99)
HCT VFR BLD AUTO: 30.2 % (ref 35–47)
HGB BLD-MCNC: 10.3 G/DL (ref 11.7–15.7)
MCH RBC QN AUTO: 29.9 PG (ref 26.5–33)
MCHC RBC AUTO-ENTMCNC: 34.1 G/DL (ref 31.5–36.5)
MCV RBC AUTO: 88 FL (ref 78–100)
PLATELET # BLD AUTO: 324 10E9/L (ref 150–450)
POTASSIUM SERPL-SCNC: 4 MMOL/L (ref 3.4–5.3)
PROT UR-MCNC: <0.05 G/L
PROT/CREAT 24H UR: NORMAL G/G CR (ref 0–0.2)
RBC # BLD AUTO: 3.45 10E12/L (ref 3.8–5.2)
SODIUM SERPL-SCNC: 142 MMOL/L (ref 133–144)
URATE SERPL-MCNC: 4.8 MG/DL (ref 2.6–6)
WBC # BLD AUTO: 8.4 10E9/L (ref 4–11)

## 2017-10-04 PROCEDURE — 85027 COMPLETE CBC AUTOMATED: CPT | Performed by: OBSTETRICS & GYNECOLOGY

## 2017-10-04 PROCEDURE — 84450 TRANSFERASE (AST) (SGOT): CPT | Performed by: OBSTETRICS & GYNECOLOGY

## 2017-10-04 PROCEDURE — 25000132 ZZH RX MED GY IP 250 OP 250 PS 637: Performed by: OBSTETRICS & GYNECOLOGY

## 2017-10-04 PROCEDURE — 84550 ASSAY OF BLOOD/URIC ACID: CPT | Performed by: OBSTETRICS & GYNECOLOGY

## 2017-10-04 PROCEDURE — 12000037 ZZH R&B POSTPARTUM INTERMEDIATE

## 2017-10-04 PROCEDURE — 80048 BASIC METABOLIC PNL TOTAL CA: CPT | Performed by: OBSTETRICS & GYNECOLOGY

## 2017-10-04 PROCEDURE — 36415 COLL VENOUS BLD VENIPUNCTURE: CPT | Performed by: OBSTETRICS & GYNECOLOGY

## 2017-10-04 PROCEDURE — 84156 ASSAY OF PROTEIN URINE: CPT | Performed by: OBSTETRICS & GYNECOLOGY

## 2017-10-04 PROCEDURE — 25000128 H RX IP 250 OP 636: Performed by: OBSTETRICS & GYNECOLOGY

## 2017-10-04 PROCEDURE — 82565 ASSAY OF CREATININE: CPT | Performed by: OBSTETRICS & GYNECOLOGY

## 2017-10-04 PROCEDURE — 84460 ALANINE AMINO (ALT) (SGPT): CPT | Performed by: OBSTETRICS & GYNECOLOGY

## 2017-10-04 RX ORDER — NIFEDIPINE 10 MG/1
10 CAPSULE ORAL
Status: DISCONTINUED | OUTPATIENT
Start: 2017-10-04 | End: 2017-10-06 | Stop reason: HOSPADM

## 2017-10-04 RX ORDER — MAGNESIUM SULFATE HEPTAHYDRATE 40 MG/ML
4 INJECTION, SOLUTION INTRAVENOUS ONCE
Status: COMPLETED | OUTPATIENT
Start: 2017-10-04 | End: 2017-10-04

## 2017-10-04 RX ORDER — SODIUM CHLORIDE, SODIUM LACTATE, POTASSIUM CHLORIDE, CALCIUM CHLORIDE 600; 310; 30; 20 MG/100ML; MG/100ML; MG/100ML; MG/100ML
10-125 INJECTION, SOLUTION INTRAVENOUS CONTINUOUS
Status: DISCONTINUED | OUTPATIENT
Start: 2017-10-04 | End: 2017-10-06 | Stop reason: HOSPADM

## 2017-10-04 RX ORDER — MAGNESIUM SULFATE IN WATER 40 MG/ML
1.5 INJECTION, SOLUTION INTRAVENOUS CONTINUOUS
Status: DISCONTINUED | OUTPATIENT
Start: 2017-10-04 | End: 2017-10-05 | Stop reason: CLARIF

## 2017-10-04 RX ORDER — MAGNESIUM SULFATE HEPTAHYDRATE 40 MG/ML
4 INJECTION, SOLUTION INTRAVENOUS
Status: DISCONTINUED | OUTPATIENT
Start: 2017-10-04 | End: 2017-10-06 | Stop reason: HOSPADM

## 2017-10-04 RX ORDER — LABETALOL 100 MG/1
300 TABLET, FILM COATED ORAL EVERY 8 HOURS SCHEDULED
Status: DISCONTINUED | OUTPATIENT
Start: 2017-10-04 | End: 2017-10-06 | Stop reason: HOSPADM

## 2017-10-04 RX ORDER — MAGNESIUM SULFATE HEPTAHYDRATE 500 MG/ML
4 INJECTION, SOLUTION INTRAMUSCULAR; INTRAVENOUS
Status: DISCONTINUED | OUTPATIENT
Start: 2017-10-04 | End: 2017-10-06 | Stop reason: HOSPADM

## 2017-10-04 RX ORDER — LABETALOL 100 MG/1
100 TABLET, FILM COATED ORAL ONCE
Status: COMPLETED | OUTPATIENT
Start: 2017-10-04 | End: 2017-10-04

## 2017-10-04 RX ORDER — CALCIUM GLUCONATE 94 MG/ML
1 INJECTION, SOLUTION INTRAVENOUS
Status: DISCONTINUED | OUTPATIENT
Start: 2017-10-04 | End: 2017-10-06 | Stop reason: HOSPADM

## 2017-10-04 RX ORDER — LORAZEPAM 2 MG/ML
2 INJECTION INTRAMUSCULAR
Status: DISCONTINUED | OUTPATIENT
Start: 2017-10-04 | End: 2017-10-06 | Stop reason: HOSPADM

## 2017-10-04 RX ADMIN — MAGNESIUM SULFATE IN WATER 4 G: 40 INJECTION, SOLUTION INTRAVENOUS at 17:56

## 2017-10-04 RX ADMIN — LABETALOL HYDROCHLORIDE 300 MG: 100 TABLET, FILM COATED ORAL at 13:57

## 2017-10-04 RX ADMIN — ACETAMINOPHEN 650 MG: 325 TABLET, FILM COATED ORAL at 20:39

## 2017-10-04 RX ADMIN — OXYCODONE HYDROCHLORIDE 5 MG: 5 TABLET ORAL at 12:00

## 2017-10-04 RX ADMIN — OXYCODONE HYDROCHLORIDE 5 MG: 5 TABLET ORAL at 20:39

## 2017-10-04 RX ADMIN — LABETALOL HCL 100 MG: 100 TABLET, FILM COATED ORAL at 10:23

## 2017-10-04 RX ADMIN — SENNOSIDES AND DOCUSATE SODIUM 1 TABLET: 8.6; 5 TABLET ORAL at 20:39

## 2017-10-04 RX ADMIN — ACETAMINOPHEN 650 MG: 325 TABLET, FILM COATED ORAL at 16:09

## 2017-10-04 RX ADMIN — MAGNESIUM SULFATE IN WATER 2 G/HR: 40 INJECTION, SOLUTION INTRAVENOUS at 18:21

## 2017-10-04 RX ADMIN — SODIUM CHLORIDE, POTASSIUM CHLORIDE, SODIUM LACTATE AND CALCIUM CHLORIDE 30 ML/HR: 600; 310; 30; 20 INJECTION, SOLUTION INTRAVENOUS at 17:56

## 2017-10-04 RX ADMIN — SENNOSIDES AND DOCUSATE SODIUM 1 TABLET: 8.6; 5 TABLET ORAL at 08:00

## 2017-10-04 RX ADMIN — ACETAMINOPHEN 650 MG: 325 TABLET, FILM COATED ORAL at 07:59

## 2017-10-04 RX ADMIN — OXYCODONE HYDROCHLORIDE 5 MG: 5 TABLET ORAL at 05:14

## 2017-10-04 RX ADMIN — IBUPROFEN 800 MG: 400 TABLET ORAL at 02:15

## 2017-10-04 RX ADMIN — OXYCODONE HYDROCHLORIDE 5 MG: 5 TABLET ORAL at 02:16

## 2017-10-04 RX ADMIN — OXYCODONE HYDROCHLORIDE 5 MG: 5 TABLET ORAL at 08:00

## 2017-10-04 RX ADMIN — CETIRIZINE HYDROCHLORIDE 10 MG: 10 TABLET, FILM COATED ORAL at 08:00

## 2017-10-04 RX ADMIN — IBUPROFEN 800 MG: 400 TABLET ORAL at 07:59

## 2017-10-04 RX ADMIN — VITAMIN D, TAB 1000IU (100/BT) 2000 UNITS: 25 TAB at 07:59

## 2017-10-04 RX ADMIN — LABETALOL HYDROCHLORIDE 300 MG: 100 TABLET, FILM COATED ORAL at 22:11

## 2017-10-04 RX ADMIN — ACETAMINOPHEN 650 MG: 325 TABLET, FILM COATED ORAL at 02:16

## 2017-10-04 RX ADMIN — LABETALOL HYDROCHLORIDE 200 MG: 100 TABLET, FILM COATED ORAL at 05:14

## 2017-10-04 RX ADMIN — ACETAMINOPHEN 650 MG: 325 TABLET, FILM COATED ORAL at 12:00

## 2017-10-04 RX ADMIN — OXYCODONE HYDROCHLORIDE 5 MG: 5 TABLET ORAL at 16:09

## 2017-10-04 NOTE — PROGRESS NOTES
"Progress note:    S: c/o dull frontal headache for most of the day, doesn't \"feel right\" in general despite napping and resting today. CS pain is controlled. Breast pumping is going well.    O:  /92, 131/82, 143/97, 151/102  Gen NAD  Abd soft, normal tenderness  Ext: 2+ edema in feet and hands, 2+ brisk patellar DTRs    A/P: POD 5 after primary CS with postpartum preeclampsia.  No response to Labetalol, now increased to 300 mg PO TID. Will continue Labetalol with adjustment to dosing as needed.  Magnesium sulfate IV x 24 hours, 4g load followed by 2g/h continuous  CBC, CMP, Uric acid, U prot/Cr now, repeat bloodwork in AM    Jennifer L. Schwab M.D.  "

## 2017-10-04 NOTE — PLAN OF CARE
Problem: Patient Care Overview  Goal: Plan of Care/Patient Progress Review  Outcome: Improving  Vitals stable. Blood pressures improved this evening from day shift. Up ad yogi well. Voiding without difficulty. Breastfeeding going well. Milk coming in. Pumping after feedings. Supplementing infant with own EBM. Pain controlled.

## 2017-10-04 NOTE — PLAN OF CARE
Problem: Patient Care Overview  Goal: Plan of Care/Patient Progress Review  Outcome: Improving  VSS except hypertensive, MD aware, pt asymptomatic.  Breastfeeding and supplementing EBM.  Pumping.  Tylenol, Ibuprofen, and Oxycodone controlling pain.  FF @ midline.  Abdominal incision with steri-strips.  Voiding adequately.  ALT, AST, and uric acid drawn this am.  Will continue to monitor.

## 2017-10-04 NOTE — PROGRESS NOTES
POD # 5     S: Patient has an intermittent, dull headache, feels like she needs caffeine. It was present yesterday, woke up without a headache and this morning it has returned.  No change in vision, no chest pain or shortness of breath, no RUQ/epigastric pain. Tolerating a regular diet. Ambulating, voiding, breastfeeding and pumping milk.  Has not gotten much sleep postpartum. Increased anxiety due to concern over infant losing weight.  Also c/o lower back pain.    O: -149/92-95 P 74 R 16 T 98.4  Gen NAD, pumping milk  Ext: 2+ LE edema    AST 65--> 51   ALT 33-> 33  Uric acid 4.6 --> 4.8    A/P: POD 5 after primary CS with postpartum hypertension  BP not improved with Labetalol 200 mg TID yesterday. Will give 100 mg now and then increase to 300 mg TID.  Discussed minimizing stressors, getting rest. Will reevaluate this afternoon to decide if ready for discharge.  Discontinued ibuprofen.    Jennifer L. Schwab, M.D.

## 2017-10-04 NOTE — PLAN OF CARE
Problem: Patient Care Overview  Goal: Plan of Care/Patient Progress Review  Outcome: Improving  Blood pressure improving. Up and about in room.  Incision healing well.  Oral pain medications working.  Working on breast feeding. Seen x 2 by Lactation consultant.  Milk in - pumping good volumes.  Dr Schwab to see this afternoon for possible discharge this evening.

## 2017-10-04 NOTE — LACTATION NOTE
Follow up visit.  Infant gaining weight.  Milk is in and getting increasing volume when pumping.  Infant latching well to both breasts.  Did not hear many swallows on R side this morning.  Baby was doing more non-nutritive sucking.  When dropper of milk used he did start sucking better with audible swallowing.  Reviewed importance of listening for swallows during feeding.  Developed feeding plan with pediatrician during visit.  Plan is to breast feed during the day and supplement after if baby is not content.  Pumping after each feeding during the daytime.  Night time Manpreet plans to breast feed and not supplement unless baby is frantic and not satisfied and no pumping overnight.  Follow up is planned for Friday.  Reviewed lactation resources at clinic.  Manpreet stated she felt more comfortable with the plan for feeding and had no concerns at this time.    Kat Gilliam  RN, IBCLC

## 2017-10-05 VITALS
BODY MASS INDEX: 35.53 KG/M2 | OXYGEN SATURATION: 100 % | HEART RATE: 70 BPM | DIASTOLIC BLOOD PRESSURE: 94 MMHG | SYSTOLIC BLOOD PRESSURE: 134 MMHG | WEIGHT: 208.11 LBS | HEIGHT: 64 IN | TEMPERATURE: 98.7 F | RESPIRATION RATE: 16 BRPM

## 2017-10-05 LAB
ALT SERPL W P-5'-P-CCNC: 38 U/L (ref 0–50)
ANION GAP SERPL CALCULATED.3IONS-SCNC: 9 MMOL/L (ref 3–14)
AST SERPL W P-5'-P-CCNC: 54 U/L (ref 0–45)
BUN SERPL-MCNC: 9 MG/DL (ref 7–30)
CALCIUM SERPL-MCNC: 7.3 MG/DL (ref 8.5–10.1)
CHLORIDE SERPL-SCNC: 104 MMOL/L (ref 94–109)
CO2 SERPL-SCNC: 24 MMOL/L (ref 20–32)
CREAT SERPL-MCNC: 0.63 MG/DL (ref 0.52–1.04)
ERYTHROCYTE [DISTWIDTH] IN BLOOD BY AUTOMATED COUNT: 13.4 % (ref 10–15)
GFR SERPL CREATININE-BSD FRML MDRD: >90 ML/MIN/1.7M2
GLUCOSE SERPL-MCNC: 89 MG/DL (ref 70–99)
HCT VFR BLD AUTO: 29.7 % (ref 35–47)
HGB BLD-MCNC: 10.2 G/DL (ref 11.7–15.7)
MAGNESIUM SERPL-MCNC: 5.5 MG/DL (ref 1.6–2.3)
MCH RBC QN AUTO: 29.6 PG (ref 26.5–33)
MCHC RBC AUTO-ENTMCNC: 34.3 G/DL (ref 31.5–36.5)
MCV RBC AUTO: 86 FL (ref 78–100)
PLATELET # BLD AUTO: 332 10E9/L (ref 150–450)
POTASSIUM SERPL-SCNC: 3.9 MMOL/L (ref 3.4–5.3)
RBC # BLD AUTO: 3.45 10E12/L (ref 3.8–5.2)
SODIUM SERPL-SCNC: 137 MMOL/L (ref 133–144)
WBC # BLD AUTO: 9 10E9/L (ref 4–11)

## 2017-10-05 PROCEDURE — 84460 ALANINE AMINO (ALT) (SGPT): CPT | Performed by: OBSTETRICS & GYNECOLOGY

## 2017-10-05 PROCEDURE — 25000128 H RX IP 250 OP 636: Performed by: OBSTETRICS & GYNECOLOGY

## 2017-10-05 PROCEDURE — 85027 COMPLETE CBC AUTOMATED: CPT | Performed by: OBSTETRICS & GYNECOLOGY

## 2017-10-05 PROCEDURE — 25000132 ZZH RX MED GY IP 250 OP 250 PS 637: Performed by: OBSTETRICS & GYNECOLOGY

## 2017-10-05 PROCEDURE — 36415 COLL VENOUS BLD VENIPUNCTURE: CPT | Performed by: OBSTETRICS & GYNECOLOGY

## 2017-10-05 PROCEDURE — 80048 BASIC METABOLIC PNL TOTAL CA: CPT | Performed by: OBSTETRICS & GYNECOLOGY

## 2017-10-05 PROCEDURE — 83735 ASSAY OF MAGNESIUM: CPT | Performed by: OBSTETRICS & GYNECOLOGY

## 2017-10-05 PROCEDURE — 84450 TRANSFERASE (AST) (SGOT): CPT | Performed by: OBSTETRICS & GYNECOLOGY

## 2017-10-05 RX ORDER — LABETALOL 300 MG/1
300 TABLET, FILM COATED ORAL EVERY 8 HOURS
Qty: 60 TABLET | Refills: 0 | Status: SHIPPED | OUTPATIENT
Start: 2017-10-05

## 2017-10-05 RX ORDER — OXYCODONE HYDROCHLORIDE 5 MG/1
5 TABLET ORAL EVERY 4 HOURS PRN
Qty: 20 TABLET | Refills: 0 | Status: SHIPPED | OUTPATIENT
Start: 2017-10-05

## 2017-10-05 RX ORDER — ACETAMINOPHEN 325 MG/1
650 TABLET ORAL EVERY 4 HOURS PRN
Qty: 100 TABLET | COMMUNITY
Start: 2017-10-05

## 2017-10-05 RX ADMIN — OXYCODONE HYDROCHLORIDE 5 MG: 5 TABLET ORAL at 11:24

## 2017-10-05 RX ADMIN — LABETALOL HYDROCHLORIDE 300 MG: 100 TABLET, FILM COATED ORAL at 13:51

## 2017-10-05 RX ADMIN — ACETAMINOPHEN 650 MG: 325 TABLET, FILM COATED ORAL at 14:58

## 2017-10-05 RX ADMIN — OXYCODONE HYDROCHLORIDE 5 MG: 5 TABLET ORAL at 14:25

## 2017-10-05 RX ADMIN — MAGNESIUM SULFATE IN WATER 2 G/HR: 40 INJECTION, SOLUTION INTRAVENOUS at 03:27

## 2017-10-05 RX ADMIN — OXYCODONE HYDROCHLORIDE 5 MG: 5 TABLET ORAL at 18:58

## 2017-10-05 RX ADMIN — SENNOSIDES AND DOCUSATE SODIUM 1 TABLET: 8.6; 5 TABLET ORAL at 18:58

## 2017-10-05 RX ADMIN — CETIRIZINE HYDROCHLORIDE 10 MG: 10 TABLET, FILM COATED ORAL at 08:38

## 2017-10-05 RX ADMIN — ACETAMINOPHEN 650 MG: 325 TABLET, FILM COATED ORAL at 18:58

## 2017-10-05 RX ADMIN — OXYCODONE HYDROCHLORIDE 5 MG: 5 TABLET ORAL at 08:38

## 2017-10-05 RX ADMIN — MAGNESIUM SULFATE IN WATER 1.5 G/HR: 40 INJECTION, SOLUTION INTRAVENOUS at 09:01

## 2017-10-05 RX ADMIN — SENNOSIDES AND DOCUSATE SODIUM 1 TABLET: 8.6; 5 TABLET ORAL at 08:38

## 2017-10-05 RX ADMIN — SODIUM CHLORIDE, POTASSIUM CHLORIDE, SODIUM LACTATE AND CALCIUM CHLORIDE 75 ML/HR: 600; 310; 30; 20 INJECTION, SOLUTION INTRAVENOUS at 05:24

## 2017-10-05 RX ADMIN — ACETAMINOPHEN 650 MG: 325 TABLET, FILM COATED ORAL at 08:38

## 2017-10-05 RX ADMIN — OXYCODONE HYDROCHLORIDE 5 MG: 5 TABLET ORAL at 00:23

## 2017-10-05 RX ADMIN — ACETAMINOPHEN 650 MG: 325 TABLET, FILM COATED ORAL at 00:23

## 2017-10-05 RX ADMIN — LABETALOL HYDROCHLORIDE 300 MG: 100 TABLET, FILM COATED ORAL at 21:02

## 2017-10-05 RX ADMIN — PRENATAL VIT W/ FE FUMARATE-FA TAB 27-0.8 MG 1 TABLET: 27-0.8 TAB at 08:38

## 2017-10-05 RX ADMIN — MAGNESIUM SULFATE IN WATER 1.5 G/HR: 40 INJECTION, SOLUTION INTRAVENOUS at 14:27

## 2017-10-05 RX ADMIN — LABETALOL HYDROCHLORIDE 300 MG: 100 TABLET, FILM COATED ORAL at 05:37

## 2017-10-05 RX ADMIN — OXYCODONE HYDROCHLORIDE 5 MG: 5 TABLET ORAL at 05:22

## 2017-10-05 RX ADMIN — ACETAMINOPHEN 650 MG: 325 TABLET, FILM COATED ORAL at 05:22

## 2017-10-05 RX ADMIN — VITAMIN D, TAB 1000IU (100/BT) 2000 UNITS: 25 TAB at 08:38

## 2017-10-05 NOTE — PROGRESS NOTES
S: C/o generalized achiness, nasal congestion, dry mouth and headache on Magnesium.  Also abdominal pain at uterine fundus      O : -138/79-94 P 73 R 16 T 98.4  Gen NAD  Abd: soft, fundal tenderness, generalized tenderness appropriate for postoperative  Inc: clean dry and intact. Steri strips removed.  Ext: 1+ LE edema    Labs pending    A/P: POD # 6 s/p primary CS with postpartum preeclampsia  Will decrease Magnesium sulfate to 1.5g/h due to intolerance of side effects. No toxic symptoms.  BP improved. Continue Labetalol 300mg TID  Anticipate likely discharge home tonight after 24 hours of Magnesium sulfate.  Discussed home BP monitoring with hold parameters sBP<100, dBP<60 or HR<60  Follow-up on Monday, 10/9 at 9AM at [facility] for BP check.    Jennifer L. Schwab

## 2017-10-05 NOTE — PLAN OF CARE
Problem: Patient Care Overview  Goal: Plan of Care/Patient Progress Review  Outcome: Improving  Data: Vital signs within normal limits. Postpartum checks within normal limits - see flow record. Patient eating and drinking normally. Patient able to empty bladder independently and is up ambulating. No apparent signs of infection. Incision healing well. Patient performing self cares and is able to care for infant.  Action: Patient medicated during the shift for pain. See MAR. Patient reassessed within 1 hour after each medication and pain was improved - patient stated she was comfortable. Patient education done. See flow record.  Response: Positive attachment behaviors observed with infant. Support persons is present.   Plan: Anticipate discharge on 10/5.

## 2017-10-05 NOTE — DISCHARGE SUMMARY
[facility] Discharge Summary    Manpreet Zabala MRN# [id number]   Age: 26 year old YOB: 1990     Date of Admission:  2017  Date of Discharge::  10/5/2017  Admitting Physician:  Josselin Boswell MD  Discharge Physician:  Jennifer L. Schwab, MD     Home clinic: [facility]          Admission Diagnoses:   Postdates induction of labor  GBS positive          Discharge Diagnosis:   Arrest of Descent  Postpartum preeclampsia          Procedures:   Epidural  Primary low transverse  section           Medications Prior to Admission:   Prenatal vitamin          Discharge Medications:   Oxycodone 5 mg PO every 4 hours prn pain  Labetalol 300 mg PO every 8 hours  Tylenol, colace, Ferrous sulfate, prenatal vitamin          Consultations:   none          Brief History of Labor or Admission:   26 year old  at 41 weeks 1 day presented for a postdates induction of labor. She was 4cm on presentation. Pitocin and AROM. Penicillin for GBS+  Arrest of descent at 0 station diagnosed after pushing for 1+ hours with good maternal effort. Cat II FHT with recurrent deep variable decelerations.   Decision made for primary LTCS.  Uncomplicated LTCS with delivery of male infant, OP presentation, APGAR 9 and 9. Weight 8 lbs 15 oz.           Hospital Course:   The patient's hospital course was complicated by mildly elevated blood pressure noticed on POD 2. Labetalol 200 mg PO TID started on POD#4. AST mildly elevated at 65. No improvement of BP on Labetalol, increased to 300mg PO TID. On POD#5 an intermittent headache worsened and became persistent. Brisk DTRs. Decision made for 24 hours of Magnesium Sulfate for presumed mild preeclampsia. BP normalized on POD 5 and 6.  No severe range BPs during the entire hospitalization.    On discharge, her pain was well controlled. Vaginal bleeding is similar to peak menstrual flow.  Voiding without difficulty.  Ambulating well and tolerating a normal diet.   No fever or significant wound drainage.  Breastfeeding well.  Infant is stable.  She has had a bowel movement.  She was discharged on post-partum day #6.    Post-partum hemoglobin:   Hemoglobin   Date Value Ref Range Status   10/04/2017 10.3 (L) 11.7 - 15.7 g/dL Final             Discharge Instructions and Follow-Up:   Discharge diet: regular   Discharge activity: Nothing per vagina x 6 weeks, no heavy lifting x 6 weeks   Discharge follow-up: BP check on Oct 9 9AM   Wound care: Keep incision clean and dry           Discharge Disposition:   home      Attestation:  I evaluated patient and labs on day of discharge. Total time spent: 30 min.    Jennifer L. Schwab, MD

## 2017-10-05 NOTE — LACTATION NOTE
Follow up visit.  Infant feeding well.  Breast feeding independently, not supplementing.  Pumping a couple times a day.  Encouraged her to continue pumping as she is while baby is using a shield on R side.   Kat Gilliam RN, IBCLC

## 2017-10-05 NOTE — PLAN OF CARE
Problem: Postpartum ( Delivery) (Adult,Obstetrics,Pediatric)  Goal: Signs and Symptoms of Listed Potential Problems Will be Absent, Minimized or Managed (Postpartum)  Signs and symptoms of listed potential problems will be absent, minimized or managed by discharge/transition of care (reference Postpartum ( Delivery) (Adult,Obstetrics,Pediatric) CPG).   Outcome: No Change  Blood pressures elevated. MD orders for IV magnesium sulfate. Loading dose at 1800. Maintenance dose at 2g/hr infusing now. Pt tolerating well. Tolerating diet. Voiding without difficulty. Mild headache. No visual disturbances reported. Able to ambulate well. Infant discharged on day shift. Rooming in agreement signed by pt. Pt continues to breastfeeding infant and pump after feedings. Repeat labs in AM.

## 2017-10-05 NOTE — PLAN OF CARE
Problem: Postpartum ( Delivery) (Adult,Obstetrics,Pediatric)  Goal: Signs and Symptoms of Listed Potential Problems Will be Absent, Minimized or Managed (Postpartum)  Signs and symptoms of listed potential problems will be absent, minimized or managed by discharge/transition of care (reference Postpartum ( Delivery) (Adult,Obstetrics,Pediatric) CPG).   Outcome: Improving  Blood pressure within normal range today.  Magnesium sulphate therapy reduced to 1.5mg per hour per Dr Schwab. Will be D/cd at 1800. May go home this evening if feels well or stay until tomorrow am.  AST and ALT improving.  C/O headache and stuffiness. Taking Tylenol and Oxycodone. Incision healing well.  Voiding in large amounts.  Breast feeding baby without difficulty.  Will continue to monitor.

## 2017-10-06 NOTE — PLAN OF CARE
Discussed discharge instructions with pt and spouse who verbalize understanding. Discharge to home.

## 2017-10-06 NOTE — PLAN OF CARE
Problem: Patient Care Overview  Goal: Plan of Care/Patient Progress Review  Outcome: Adequate for Discharge Date Met:  10/05/17  Magnesium sulfate discontinued at 1800. Pt feeling much better now. Headache is dull. Ambulated in hallway and showered and tolerated well. Pt and spouse would like to discharge home. Pt's parents will also be at their house to help out tonight. Pt instructed to continue to check blood pressures at home and call clinic as directed by MD. Pain controlled. Discharge prescriptions filled at discharge pharmacy today. Will give 2200 dose of Labetalol before discharge home.

## 2017-11-07 NOTE — ADDENDUM NOTE
Addendum  created 11/07/17 1210 by Mayra Gandhi APRN CRNA    Anesthesia Event deleted, Anesthesia Event edited, Procedure Event Log accessed

## (undated) DEVICE — SU VICRYL 0 CT 36" J358H

## (undated) DEVICE — PREP CHLORAPREP 26ML TINTED ORANGE  260815

## (undated) DEVICE — LINEN C-SECTION 5415

## (undated) DEVICE — PACK C-SECTION LF PL15OTA83B

## (undated) DEVICE — GLOVE PROTEXIS W/NEU-THERA 7.0  2D73TE70

## (undated) DEVICE — SU CHROMIC 1 CTX 36" 905H

## (undated) DEVICE — SUCTION CANISTER MEDIVAC LINER 3000ML W/LID 65651-530

## (undated) DEVICE — SOL NACL 0.9% IRRIG 1000ML BOTTLE 07138-09

## (undated) DEVICE — GLOVE PROTEXIS W/NEU-THERA 6.5  2D73TE65

## (undated) DEVICE — BLADE CLIPPER 4406

## (undated) DEVICE — DRSG TELFA ISLAND 4X10"

## (undated) DEVICE — ESU GROUND PAD UNIVERSAL W/O CORD

## (undated) DEVICE — CATH TRAY FOLEY 16FR BARDEX W/DRAIN BAG STATLOCK 300316A

## (undated) DEVICE — SU VICRYL 4-0 FS-1 27" J441H

## (undated) DEVICE — SU PLAIN 2-0 CT 27" 853H

## (undated) DEVICE — DRSG STERI STRIP 1/4X3" R1541